# Patient Record
Sex: FEMALE | Race: WHITE | NOT HISPANIC OR LATINO
[De-identification: names, ages, dates, MRNs, and addresses within clinical notes are randomized per-mention and may not be internally consistent; named-entity substitution may affect disease eponyms.]

---

## 2017-01-11 ENCOUNTER — APPOINTMENT (OUTPATIENT)
Dept: PEDIATRIC ALLERGY IMMUNOLOGY | Facility: CLINIC | Age: 32
End: 2017-01-11

## 2017-02-01 ENCOUNTER — APPOINTMENT (OUTPATIENT)
Dept: PEDIATRIC ALLERGY IMMUNOLOGY | Facility: CLINIC | Age: 32
End: 2017-02-01

## 2017-03-01 ENCOUNTER — APPOINTMENT (OUTPATIENT)
Dept: PEDIATRIC ALLERGY IMMUNOLOGY | Facility: CLINIC | Age: 32
End: 2017-03-01

## 2017-03-01 DIAGNOSIS — T78.40XA ALLERGY, UNSPECIFIED, INITIAL ENCOUNTER: ICD-10-CM

## 2017-03-06 ENCOUNTER — APPOINTMENT (OUTPATIENT)
Dept: PEDIATRIC ALLERGY IMMUNOLOGY | Facility: CLINIC | Age: 32
End: 2017-03-06

## 2017-03-13 ENCOUNTER — APPOINTMENT (OUTPATIENT)
Dept: PEDIATRIC ALLERGY IMMUNOLOGY | Facility: CLINIC | Age: 32
End: 2017-03-13

## 2017-03-22 ENCOUNTER — APPOINTMENT (OUTPATIENT)
Dept: PEDIATRIC ALLERGY IMMUNOLOGY | Facility: CLINIC | Age: 32
End: 2017-03-22

## 2017-03-27 ENCOUNTER — APPOINTMENT (OUTPATIENT)
Dept: PEDIATRIC ALLERGY IMMUNOLOGY | Facility: CLINIC | Age: 32
End: 2017-03-27

## 2017-04-03 ENCOUNTER — APPOINTMENT (OUTPATIENT)
Dept: PEDIATRIC ALLERGY IMMUNOLOGY | Facility: CLINIC | Age: 32
End: 2017-04-03

## 2017-04-10 ENCOUNTER — APPOINTMENT (OUTPATIENT)
Dept: PEDIATRIC ALLERGY IMMUNOLOGY | Facility: CLINIC | Age: 32
End: 2017-04-10

## 2017-05-02 ENCOUNTER — MEDICATION RENEWAL (OUTPATIENT)
Age: 32
End: 2017-05-02

## 2017-05-04 ENCOUNTER — MEDICATION RENEWAL (OUTPATIENT)
Age: 32
End: 2017-05-04

## 2017-05-26 ENCOUNTER — APPOINTMENT (OUTPATIENT)
Dept: PEDIATRIC ALLERGY IMMUNOLOGY | Facility: CLINIC | Age: 32
End: 2017-05-26

## 2017-06-05 ENCOUNTER — APPOINTMENT (OUTPATIENT)
Dept: PEDIATRIC ALLERGY IMMUNOLOGY | Facility: CLINIC | Age: 32
End: 2017-06-05

## 2017-06-12 ENCOUNTER — APPOINTMENT (OUTPATIENT)
Dept: PEDIATRIC ALLERGY IMMUNOLOGY | Facility: CLINIC | Age: 32
End: 2017-06-12

## 2017-06-14 ENCOUNTER — APPOINTMENT (OUTPATIENT)
Dept: PEDIATRIC ALLERGY IMMUNOLOGY | Facility: CLINIC | Age: 32
End: 2017-06-14

## 2017-06-19 ENCOUNTER — APPOINTMENT (OUTPATIENT)
Dept: PEDIATRIC ALLERGY IMMUNOLOGY | Facility: CLINIC | Age: 32
End: 2017-06-19

## 2017-06-21 ENCOUNTER — APPOINTMENT (OUTPATIENT)
Dept: PEDIATRIC ALLERGY IMMUNOLOGY | Facility: CLINIC | Age: 32
End: 2017-06-21

## 2017-07-03 ENCOUNTER — APPOINTMENT (OUTPATIENT)
Dept: PEDIATRIC ALLERGY IMMUNOLOGY | Facility: CLINIC | Age: 32
End: 2017-07-03

## 2017-07-05 ENCOUNTER — APPOINTMENT (OUTPATIENT)
Dept: OBGYN | Facility: CLINIC | Age: 32
End: 2017-07-05

## 2017-07-24 ENCOUNTER — APPOINTMENT (OUTPATIENT)
Dept: PEDIATRIC ALLERGY IMMUNOLOGY | Facility: CLINIC | Age: 32
End: 2017-07-24

## 2017-07-25 ENCOUNTER — RX RENEWAL (OUTPATIENT)
Age: 32
End: 2017-07-25

## 2017-07-26 ENCOUNTER — APPOINTMENT (OUTPATIENT)
Dept: OBGYN | Facility: CLINIC | Age: 32
End: 2017-07-26

## 2017-08-09 ENCOUNTER — RX RENEWAL (OUTPATIENT)
Age: 32
End: 2017-08-09

## 2017-08-21 ENCOUNTER — APPOINTMENT (OUTPATIENT)
Dept: PEDIATRIC ALLERGY IMMUNOLOGY | Facility: CLINIC | Age: 32
End: 2017-08-21
Payer: COMMERCIAL

## 2017-08-21 PROCEDURE — 95165 ANTIGEN THERAPY SERVICES: CPT

## 2017-08-21 PROCEDURE — 95117 IMMUNOTHERAPY INJECTIONS: CPT

## 2017-08-22 ENCOUNTER — APPOINTMENT (OUTPATIENT)
Dept: BREAST CENTER | Facility: CLINIC | Age: 32
End: 2017-08-22
Payer: COMMERCIAL

## 2017-08-22 ENCOUNTER — APPOINTMENT (OUTPATIENT)
Dept: PLASTIC SURGERY | Facility: CLINIC | Age: 32
End: 2017-08-22
Payer: COMMERCIAL

## 2017-08-22 VITALS
HEIGHT: 66 IN | BODY MASS INDEX: 27.32 KG/M2 | DIASTOLIC BLOOD PRESSURE: 68 MMHG | HEART RATE: 89 BPM | WEIGHT: 170 LBS | RESPIRATION RATE: 16 BRPM | TEMPERATURE: 99.1 F | SYSTOLIC BLOOD PRESSURE: 110 MMHG

## 2017-08-22 DIAGNOSIS — Z80.3 FAMILY HISTORY OF MALIGNANT NEOPLASM OF BREAST: ICD-10-CM

## 2017-08-22 PROCEDURE — 99245 OFF/OP CONSLTJ NEW/EST HI 55: CPT

## 2017-09-18 ENCOUNTER — APPOINTMENT (OUTPATIENT)
Dept: PEDIATRIC ALLERGY IMMUNOLOGY | Facility: CLINIC | Age: 32
End: 2017-09-18
Payer: COMMERCIAL

## 2017-09-18 PROCEDURE — 95165 ANTIGEN THERAPY SERVICES: CPT

## 2017-09-18 PROCEDURE — 95117 IMMUNOTHERAPY INJECTIONS: CPT

## 2017-10-16 ENCOUNTER — APPOINTMENT (OUTPATIENT)
Dept: PEDIATRIC ALLERGY IMMUNOLOGY | Facility: CLINIC | Age: 32
End: 2017-10-16
Payer: COMMERCIAL

## 2017-10-16 PROCEDURE — 95165 ANTIGEN THERAPY SERVICES: CPT

## 2017-10-16 PROCEDURE — 95117 IMMUNOTHERAPY INJECTIONS: CPT

## 2017-11-13 ENCOUNTER — APPOINTMENT (OUTPATIENT)
Dept: PEDIATRIC ALLERGY IMMUNOLOGY | Facility: CLINIC | Age: 32
End: 2017-11-13
Payer: COMMERCIAL

## 2017-11-13 PROCEDURE — 95165 ANTIGEN THERAPY SERVICES: CPT

## 2017-11-13 PROCEDURE — 95117 IMMUNOTHERAPY INJECTIONS: CPT

## 2017-12-11 ENCOUNTER — APPOINTMENT (OUTPATIENT)
Dept: PEDIATRIC ALLERGY IMMUNOLOGY | Facility: CLINIC | Age: 32
End: 2017-12-11
Payer: COMMERCIAL

## 2017-12-11 PROCEDURE — 95165 ANTIGEN THERAPY SERVICES: CPT

## 2017-12-11 PROCEDURE — 95117 IMMUNOTHERAPY INJECTIONS: CPT

## 2018-01-10 ENCOUNTER — APPOINTMENT (OUTPATIENT)
Dept: PEDIATRIC ALLERGY IMMUNOLOGY | Facility: CLINIC | Age: 33
End: 2018-01-10
Payer: COMMERCIAL

## 2018-01-10 PROCEDURE — 95117 IMMUNOTHERAPY INJECTIONS: CPT

## 2018-01-10 PROCEDURE — 95165 ANTIGEN THERAPY SERVICES: CPT

## 2018-02-12 ENCOUNTER — APPOINTMENT (OUTPATIENT)
Dept: PEDIATRIC ALLERGY IMMUNOLOGY | Facility: CLINIC | Age: 33
End: 2018-02-12
Payer: COMMERCIAL

## 2018-02-12 PROCEDURE — 95165 ANTIGEN THERAPY SERVICES: CPT

## 2018-02-12 PROCEDURE — 95117 IMMUNOTHERAPY INJECTIONS: CPT

## 2018-03-12 ENCOUNTER — APPOINTMENT (OUTPATIENT)
Dept: PEDIATRIC ALLERGY IMMUNOLOGY | Facility: CLINIC | Age: 33
End: 2018-03-12
Payer: COMMERCIAL

## 2018-03-12 PROCEDURE — 95165 ANTIGEN THERAPY SERVICES: CPT

## 2018-03-12 PROCEDURE — 95117 IMMUNOTHERAPY INJECTIONS: CPT

## 2018-04-09 ENCOUNTER — APPOINTMENT (OUTPATIENT)
Dept: PEDIATRIC ALLERGY IMMUNOLOGY | Facility: CLINIC | Age: 33
End: 2018-04-09
Payer: COMMERCIAL

## 2018-04-09 PROCEDURE — 95117 IMMUNOTHERAPY INJECTIONS: CPT

## 2018-04-09 PROCEDURE — 95165 ANTIGEN THERAPY SERVICES: CPT

## 2018-05-07 ENCOUNTER — APPOINTMENT (OUTPATIENT)
Dept: PEDIATRIC ALLERGY IMMUNOLOGY | Facility: CLINIC | Age: 33
End: 2018-05-07
Payer: COMMERCIAL

## 2018-05-07 PROCEDURE — 95165 ANTIGEN THERAPY SERVICES: CPT

## 2018-05-07 PROCEDURE — 95117 IMMUNOTHERAPY INJECTIONS: CPT

## 2018-05-10 ENCOUNTER — APPOINTMENT (OUTPATIENT)
Dept: PLASTIC SURGERY | Facility: CLINIC | Age: 33
End: 2018-05-10
Payer: COMMERCIAL

## 2018-05-10 DIAGNOSIS — Z40.01 ENCOUNTER FOR PROPHYLACTIC REMOVAL OF BREAST: ICD-10-CM

## 2018-05-10 PROCEDURE — 99214 OFFICE O/P EST MOD 30 MIN: CPT

## 2018-05-15 PROBLEM — Z40.01 ENCOUNTER FOR PROPHYLACTIC REMOVAL OF BOTH BREASTS: Status: ACTIVE | Noted: 2017-08-22

## 2018-06-11 ENCOUNTER — APPOINTMENT (OUTPATIENT)
Dept: PEDIATRIC ALLERGY IMMUNOLOGY | Facility: CLINIC | Age: 33
End: 2018-06-11
Payer: COMMERCIAL

## 2018-06-11 PROCEDURE — 95165 ANTIGEN THERAPY SERVICES: CPT

## 2018-06-11 PROCEDURE — 95117 IMMUNOTHERAPY INJECTIONS: CPT

## 2018-06-21 ENCOUNTER — OUTPATIENT (OUTPATIENT)
Dept: OUTPATIENT SERVICES | Facility: HOSPITAL | Age: 33
LOS: 1 days | Discharge: HOME | End: 2018-06-21

## 2018-06-21 DIAGNOSIS — Z98.89 OTHER SPECIFIED POSTPROCEDURAL STATES: Chronic | ICD-10-CM

## 2018-07-09 ENCOUNTER — APPOINTMENT (OUTPATIENT)
Dept: PEDIATRIC ALLERGY IMMUNOLOGY | Facility: CLINIC | Age: 33
End: 2018-07-09
Payer: COMMERCIAL

## 2018-07-09 ENCOUNTER — OTHER (OUTPATIENT)
Age: 33
End: 2018-07-09

## 2018-07-09 PROCEDURE — 95165 ANTIGEN THERAPY SERVICES: CPT

## 2018-07-09 PROCEDURE — 95117 IMMUNOTHERAPY INJECTIONS: CPT

## 2018-08-01 ENCOUNTER — RESULT REVIEW (OUTPATIENT)
Age: 33
End: 2018-08-01

## 2018-08-01 ENCOUNTER — APPOINTMENT (OUTPATIENT)
Dept: OBGYN | Facility: CLINIC | Age: 33
End: 2018-08-01
Payer: COMMERCIAL

## 2018-08-01 ENCOUNTER — APPOINTMENT (OUTPATIENT)
Dept: PEDIATRIC ALLERGY IMMUNOLOGY | Facility: CLINIC | Age: 33
End: 2018-08-01
Payer: COMMERCIAL

## 2018-08-01 PROCEDURE — 99395 PREV VISIT EST AGE 18-39: CPT

## 2018-08-01 PROCEDURE — 95117 IMMUNOTHERAPY INJECTIONS: CPT

## 2018-08-01 PROCEDURE — 36415 COLL VENOUS BLD VENIPUNCTURE: CPT

## 2018-08-01 PROCEDURE — 95165 ANTIGEN THERAPY SERVICES: CPT

## 2018-08-31 ENCOUNTER — APPOINTMENT (OUTPATIENT)
Dept: PEDIATRIC ALLERGY IMMUNOLOGY | Facility: CLINIC | Age: 33
End: 2018-08-31
Payer: COMMERCIAL

## 2018-08-31 ENCOUNTER — APPOINTMENT (OUTPATIENT)
Dept: GYNECOLOGIC ONCOLOGY | Facility: CLINIC | Age: 33
End: 2018-08-31

## 2018-08-31 PROCEDURE — 95165 ANTIGEN THERAPY SERVICES: CPT

## 2018-08-31 PROCEDURE — 95117 IMMUNOTHERAPY INJECTIONS: CPT

## 2018-09-26 ENCOUNTER — APPOINTMENT (OUTPATIENT)
Dept: PEDIATRIC ALLERGY IMMUNOLOGY | Facility: CLINIC | Age: 33
End: 2018-09-26
Payer: COMMERCIAL

## 2018-09-26 PROCEDURE — 95117 IMMUNOTHERAPY INJECTIONS: CPT

## 2018-09-26 PROCEDURE — 95165 ANTIGEN THERAPY SERVICES: CPT

## 2018-10-24 ENCOUNTER — APPOINTMENT (OUTPATIENT)
Dept: PEDIATRIC ALLERGY IMMUNOLOGY | Facility: CLINIC | Age: 33
End: 2018-10-24
Payer: COMMERCIAL

## 2018-10-24 VITALS
HEART RATE: 85 BPM | WEIGHT: 175.8 LBS | DIASTOLIC BLOOD PRESSURE: 76 MMHG | SYSTOLIC BLOOD PRESSURE: 111 MMHG | BODY MASS INDEX: 28.38 KG/M2

## 2018-10-24 DIAGNOSIS — L85.3 XEROSIS CUTIS: ICD-10-CM

## 2018-10-24 PROCEDURE — 95117 IMMUNOTHERAPY INJECTIONS: CPT | Mod: GC

## 2018-10-24 PROCEDURE — 99214 OFFICE O/P EST MOD 30 MIN: CPT | Mod: 25,GC

## 2018-10-24 PROCEDURE — 95165 ANTIGEN THERAPY SERVICES: CPT | Mod: GC

## 2018-11-19 ENCOUNTER — APPOINTMENT (OUTPATIENT)
Dept: PEDIATRIC ALLERGY IMMUNOLOGY | Facility: CLINIC | Age: 33
End: 2018-11-19
Payer: COMMERCIAL

## 2018-11-19 PROCEDURE — 95117 IMMUNOTHERAPY INJECTIONS: CPT

## 2018-11-19 PROCEDURE — 95165 ANTIGEN THERAPY SERVICES: CPT

## 2018-12-17 ENCOUNTER — APPOINTMENT (OUTPATIENT)
Dept: PEDIATRIC ALLERGY IMMUNOLOGY | Facility: CLINIC | Age: 33
End: 2018-12-17
Payer: COMMERCIAL

## 2018-12-17 PROCEDURE — 95165 ANTIGEN THERAPY SERVICES: CPT

## 2018-12-17 PROCEDURE — 95117 IMMUNOTHERAPY INJECTIONS: CPT

## 2019-01-10 ENCOUNTER — APPOINTMENT (OUTPATIENT)
Dept: OBGYN | Facility: CLINIC | Age: 34
End: 2019-01-10
Payer: COMMERCIAL

## 2019-01-10 PROCEDURE — 99213 OFFICE O/P EST LOW 20 MIN: CPT

## 2019-01-10 PROCEDURE — 36415 COLL VENOUS BLD VENIPUNCTURE: CPT

## 2019-01-14 ENCOUNTER — APPOINTMENT (OUTPATIENT)
Dept: PEDIATRIC ALLERGY IMMUNOLOGY | Facility: CLINIC | Age: 34
End: 2019-01-14
Payer: COMMERCIAL

## 2019-01-14 PROCEDURE — 95117 IMMUNOTHERAPY INJECTIONS: CPT

## 2019-01-14 PROCEDURE — 95165 ANTIGEN THERAPY SERVICES: CPT

## 2019-02-15 ENCOUNTER — APPOINTMENT (OUTPATIENT)
Dept: PEDIATRIC ALLERGY IMMUNOLOGY | Facility: CLINIC | Age: 34
End: 2019-02-15
Payer: COMMERCIAL

## 2019-02-15 PROCEDURE — 95165 ANTIGEN THERAPY SERVICES: CPT

## 2019-02-15 PROCEDURE — 95117 IMMUNOTHERAPY INJECTIONS: CPT

## 2019-02-28 ENCOUNTER — TRANSCRIPTION ENCOUNTER (OUTPATIENT)
Age: 34
End: 2019-02-28

## 2019-03-13 ENCOUNTER — APPOINTMENT (OUTPATIENT)
Dept: PEDIATRIC ALLERGY IMMUNOLOGY | Facility: CLINIC | Age: 34
End: 2019-03-13
Payer: COMMERCIAL

## 2019-03-13 ENCOUNTER — APPOINTMENT (OUTPATIENT)
Dept: OBGYN | Facility: CLINIC | Age: 34
End: 2019-03-13

## 2019-03-13 PROCEDURE — 95117 IMMUNOTHERAPY INJECTIONS: CPT | Mod: GC

## 2019-03-13 PROCEDURE — 95165 ANTIGEN THERAPY SERVICES: CPT | Mod: GC

## 2019-04-08 ENCOUNTER — APPOINTMENT (OUTPATIENT)
Dept: PEDIATRIC ALLERGY IMMUNOLOGY | Facility: CLINIC | Age: 34
End: 2019-04-08
Payer: COMMERCIAL

## 2019-04-08 PROCEDURE — 95165 ANTIGEN THERAPY SERVICES: CPT

## 2019-04-08 PROCEDURE — 95117 IMMUNOTHERAPY INJECTIONS: CPT

## 2019-04-11 ENCOUNTER — APPOINTMENT (OUTPATIENT)
Dept: OBGYN | Facility: CLINIC | Age: 34
End: 2019-04-11

## 2019-04-11 ENCOUNTER — APPOINTMENT (OUTPATIENT)
Dept: OBGYN | Facility: CLINIC | Age: 34
End: 2019-04-11
Payer: COMMERCIAL

## 2019-04-11 PROCEDURE — 58301 REMOVE INTRAUTERINE DEVICE: CPT

## 2019-05-07 ENCOUNTER — APPOINTMENT (OUTPATIENT)
Dept: PEDIATRIC ALLERGY IMMUNOLOGY | Facility: CLINIC | Age: 34
End: 2019-05-07
Payer: COMMERCIAL

## 2019-05-07 PROCEDURE — 95117 IMMUNOTHERAPY INJECTIONS: CPT

## 2019-05-07 PROCEDURE — 95165 ANTIGEN THERAPY SERVICES: CPT

## 2019-06-04 ENCOUNTER — APPOINTMENT (OUTPATIENT)
Dept: PEDIATRIC ALLERGY IMMUNOLOGY | Facility: CLINIC | Age: 34
End: 2019-06-04
Payer: COMMERCIAL

## 2019-06-04 PROCEDURE — 95165 ANTIGEN THERAPY SERVICES: CPT

## 2019-06-04 PROCEDURE — 95117 IMMUNOTHERAPY INJECTIONS: CPT

## 2019-06-10 ENCOUNTER — OUTPATIENT (OUTPATIENT)
Dept: OUTPATIENT SERVICES | Facility: HOSPITAL | Age: 34
LOS: 1 days | Discharge: HOME | End: 2019-06-10

## 2019-06-10 DIAGNOSIS — Z98.89 OTHER SPECIFIED POSTPROCEDURAL STATES: Chronic | ICD-10-CM

## 2019-06-10 DIAGNOSIS — D64.9 ANEMIA, UNSPECIFIED: ICD-10-CM

## 2019-06-14 ENCOUNTER — APPOINTMENT (OUTPATIENT)
Dept: GYNECOLOGIC ONCOLOGY | Facility: CLINIC | Age: 34
End: 2019-06-14
Payer: COMMERCIAL

## 2019-06-14 ENCOUNTER — RESULT REVIEW (OUTPATIENT)
Age: 34
End: 2019-06-14

## 2019-06-14 ENCOUNTER — APPOINTMENT (OUTPATIENT)
Dept: HEMATOLOGY ONCOLOGY | Facility: CLINIC | Age: 34
End: 2019-06-14
Payer: COMMERCIAL

## 2019-06-14 VITALS
HEIGHT: 66 IN | SYSTOLIC BLOOD PRESSURE: 120 MMHG | BODY MASS INDEX: 27.64 KG/M2 | HEART RATE: 72 BPM | DIASTOLIC BLOOD PRESSURE: 79 MMHG | WEIGHT: 172 LBS

## 2019-06-14 VITALS
HEIGHT: 66.93 IN | DIASTOLIC BLOOD PRESSURE: 75 MMHG | SYSTOLIC BLOOD PRESSURE: 117 MMHG | HEART RATE: 77 BPM | OXYGEN SATURATION: 100 % | TEMPERATURE: 98.2 F | WEIGHT: 173.06 LBS | RESPIRATION RATE: 16 BRPM | BODY MASS INDEX: 27.16 KG/M2

## 2019-06-14 DIAGNOSIS — D72.819 DECREASED WHITE BLOOD CELL COUNT, UNSPECIFIED: ICD-10-CM

## 2019-06-14 DIAGNOSIS — D64.9 ANEMIA, UNSPECIFIED: ICD-10-CM

## 2019-06-14 DIAGNOSIS — Z80.0 FAMILY HISTORY OF MALIGNANT NEOPLASM OF DIGESTIVE ORGANS: ICD-10-CM

## 2019-06-14 DIAGNOSIS — Z80.8 FAMILY HISTORY OF MALIGNANT NEOPLASM OF OTHER ORGANS OR SYSTEMS: ICD-10-CM

## 2019-06-14 LAB
BASOPHILS # BLD AUTO: 0 K/UL — SIGNIFICANT CHANGE UP (ref 0–0.2)
BASOPHILS NFR BLD AUTO: 0 % — SIGNIFICANT CHANGE UP (ref 0–2)
EOSINOPHIL # BLD AUTO: 0 K/UL — SIGNIFICANT CHANGE UP (ref 0–0.5)
EOSINOPHIL NFR BLD AUTO: 0.8 % — SIGNIFICANT CHANGE UP (ref 0–6)
HCT VFR BLD CALC: 35.6 % — SIGNIFICANT CHANGE UP (ref 34.5–45)
HGB BLD-MCNC: 12.3 G/DL — SIGNIFICANT CHANGE UP (ref 11.5–15.5)
LYMPHOCYTES # BLD AUTO: 1.3 K/UL — SIGNIFICANT CHANGE UP (ref 1–3.3)
LYMPHOCYTES # BLD AUTO: 29.3 % — SIGNIFICANT CHANGE UP (ref 13–44)
MCHC RBC-ENTMCNC: 34.7 G/DL — SIGNIFICANT CHANGE UP (ref 32–36)
MCHC RBC-ENTMCNC: 34.7 PG — HIGH (ref 27–34)
MCV RBC AUTO: 100 FL — SIGNIFICANT CHANGE UP (ref 80–100)
MONOCYTES # BLD AUTO: 0.2 K/UL — SIGNIFICANT CHANGE UP (ref 0–0.9)
MONOCYTES NFR BLD AUTO: 5.4 % — SIGNIFICANT CHANGE UP (ref 2–14)
NEUTROPHILS # BLD AUTO: 2.8 K/UL — SIGNIFICANT CHANGE UP (ref 1.8–7.4)
NEUTROPHILS NFR BLD AUTO: 64.4 % — SIGNIFICANT CHANGE UP (ref 43–77)
PLATELET # BLD AUTO: 250 K/UL — SIGNIFICANT CHANGE UP (ref 150–400)
RBC # BLD: 3.55 M/UL — LOW (ref 3.8–5.2)
RBC # FLD: 11 % — SIGNIFICANT CHANGE UP (ref 10.3–14.5)
WBC # BLD: 4.4 K/UL — SIGNIFICANT CHANGE UP (ref 3.8–10.5)
WBC # FLD AUTO: 4.4 K/UL — SIGNIFICANT CHANGE UP (ref 3.8–10.5)

## 2019-06-14 PROCEDURE — 99244 OFF/OP CNSLTJ NEW/EST MOD 40: CPT

## 2019-06-14 PROCEDURE — 99243 OFF/OP CNSLTJ NEW/EST LOW 30: CPT

## 2019-06-14 RX ORDER — NORETHINDRONE ACETATE/ETHINYL ESTRADIOL 1MG-20MCG
1-20 KIT ORAL
Refills: 0 | Status: DISCONTINUED | COMMUNITY
End: 2019-06-14

## 2019-06-14 RX ORDER — DESOGESTREL AND ETHINYL ESTRADIOL 0.15-0.03
KIT ORAL
Refills: 0 | Status: ACTIVE | COMMUNITY

## 2019-06-15 LAB — CANCER AG125 SERPL-ACNC: 9 U/ML

## 2019-06-18 PROBLEM — D72.819 LEUKOPENIA, UNSPECIFIED TYPE: Status: ACTIVE | Noted: 2019-06-18

## 2019-06-18 PROBLEM — D64.9 ANEMIA, UNSPECIFIED TYPE: Status: ACTIVE | Noted: 2019-06-18

## 2019-06-19 NOTE — ASSESSMENT
[FreeTextEntry1] : 35yo F here for evaluation of anemia. \par Counts are within normal today. She has slightly macrocytosis but her B12/folate/TSH/LFTs wnl. Cont to monitor\par RTC 1 yr

## 2019-06-19 NOTE — HISTORY OF PRESENT ILLNESS
[de-identified] : 35yo F here for evaluation of anemia. \par \par Pt reports that she had a physical in April (Dr. Tommie Baca is PMD) - CBC done was concerning so she had it repeated in May and the counts were lower. She reports that there is concern either WBC or RBC always low. She was sent to a hematologist in 2011 in [place] for this issue -w/u unremarkable per pt. On review of labs for the last year, her WBC was occasionally 3.7 with normal diff. Hgb ranged from 11.5-12.6. Pt has no complaints. Vit B12 547, folate 13. TSH 1.41, ferritin 68. HIV nonreactive. ESR wnl.\par \par She is in the process of egg freezing, all meds on hold (spironolactone and OCP)

## 2019-06-28 ENCOUNTER — OTHER (OUTPATIENT)
Age: 34
End: 2019-06-28

## 2019-07-01 ENCOUNTER — OTHER (OUTPATIENT)
Age: 34
End: 2019-07-01

## 2019-07-05 ENCOUNTER — APPOINTMENT (OUTPATIENT)
Dept: PEDIATRIC ALLERGY IMMUNOLOGY | Facility: CLINIC | Age: 34
End: 2019-07-05
Payer: COMMERCIAL

## 2019-07-05 PROCEDURE — 95117 IMMUNOTHERAPY INJECTIONS: CPT

## 2019-07-05 PROCEDURE — 95165 ANTIGEN THERAPY SERVICES: CPT

## 2019-07-08 ENCOUNTER — TRANSCRIPTION ENCOUNTER (OUTPATIENT)
Age: 34
End: 2019-07-08

## 2019-07-18 ENCOUNTER — OTHER (OUTPATIENT)
Age: 34
End: 2019-07-18

## 2019-08-02 ENCOUNTER — APPOINTMENT (OUTPATIENT)
Dept: PEDIATRIC ALLERGY IMMUNOLOGY | Facility: CLINIC | Age: 34
End: 2019-08-02
Payer: COMMERCIAL

## 2019-08-02 PROCEDURE — 95165 ANTIGEN THERAPY SERVICES: CPT

## 2019-08-02 PROCEDURE — 95117 IMMUNOTHERAPY INJECTIONS: CPT

## 2019-08-19 ENCOUNTER — OTHER (OUTPATIENT)
Age: 34
End: 2019-08-19

## 2019-08-28 ENCOUNTER — RESULT REVIEW (OUTPATIENT)
Age: 34
End: 2019-08-28

## 2019-08-29 ENCOUNTER — APPOINTMENT (OUTPATIENT)
Dept: OBGYN | Facility: CLINIC | Age: 34
End: 2019-08-29
Payer: COMMERCIAL

## 2019-08-29 PROCEDURE — 99395 PREV VISIT EST AGE 18-39: CPT

## 2019-08-30 ENCOUNTER — APPOINTMENT (OUTPATIENT)
Dept: PEDIATRIC ALLERGY IMMUNOLOGY | Facility: CLINIC | Age: 34
End: 2019-08-30
Payer: COMMERCIAL

## 2019-08-30 PROCEDURE — 95165 ANTIGEN THERAPY SERVICES: CPT

## 2019-08-30 PROCEDURE — 95117 IMMUNOTHERAPY INJECTIONS: CPT

## 2019-09-30 ENCOUNTER — APPOINTMENT (OUTPATIENT)
Dept: PEDIATRIC ALLERGY IMMUNOLOGY | Facility: CLINIC | Age: 34
End: 2019-09-30
Payer: COMMERCIAL

## 2019-09-30 PROCEDURE — 95165 ANTIGEN THERAPY SERVICES: CPT

## 2019-09-30 PROCEDURE — 95117 IMMUNOTHERAPY INJECTIONS: CPT

## 2019-11-01 ENCOUNTER — APPOINTMENT (OUTPATIENT)
Dept: PEDIATRIC ALLERGY IMMUNOLOGY | Facility: CLINIC | Age: 34
End: 2019-11-01
Payer: COMMERCIAL

## 2019-11-01 PROCEDURE — 95117 IMMUNOTHERAPY INJECTIONS: CPT

## 2019-11-01 PROCEDURE — 95165 ANTIGEN THERAPY SERVICES: CPT

## 2019-11-26 ENCOUNTER — APPOINTMENT (OUTPATIENT)
Dept: PEDIATRIC ALLERGY IMMUNOLOGY | Facility: CLINIC | Age: 34
End: 2019-11-26
Payer: COMMERCIAL

## 2019-11-26 PROCEDURE — 95165 ANTIGEN THERAPY SERVICES: CPT

## 2019-11-26 PROCEDURE — 95117 IMMUNOTHERAPY INJECTIONS: CPT

## 2019-12-27 ENCOUNTER — APPOINTMENT (OUTPATIENT)
Dept: PEDIATRIC ALLERGY IMMUNOLOGY | Facility: CLINIC | Age: 34
End: 2019-12-27
Payer: COMMERCIAL

## 2019-12-27 PROCEDURE — 95117 IMMUNOTHERAPY INJECTIONS: CPT

## 2019-12-27 PROCEDURE — 95165 ANTIGEN THERAPY SERVICES: CPT

## 2020-02-05 ENCOUNTER — APPOINTMENT (OUTPATIENT)
Dept: PEDIATRIC ALLERGY IMMUNOLOGY | Facility: CLINIC | Age: 35
End: 2020-02-05
Payer: COMMERCIAL

## 2020-02-05 PROCEDURE — 95117 IMMUNOTHERAPY INJECTIONS: CPT

## 2020-02-05 PROCEDURE — 95165 ANTIGEN THERAPY SERVICES: CPT

## 2020-02-07 ENCOUNTER — APPOINTMENT (OUTPATIENT)
Dept: GYNECOLOGIC ONCOLOGY | Facility: CLINIC | Age: 35
End: 2020-02-07

## 2020-02-14 ENCOUNTER — APPOINTMENT (OUTPATIENT)
Dept: OBGYN | Facility: CLINIC | Age: 35
End: 2020-02-14

## 2020-02-14 ENCOUNTER — APPOINTMENT (OUTPATIENT)
Dept: GYNECOLOGIC ONCOLOGY | Facility: CLINIC | Age: 35
End: 2020-02-14

## 2020-03-02 ENCOUNTER — APPOINTMENT (OUTPATIENT)
Dept: PEDIATRIC ALLERGY IMMUNOLOGY | Facility: CLINIC | Age: 35
End: 2020-03-02
Payer: COMMERCIAL

## 2020-03-02 PROCEDURE — 95117 IMMUNOTHERAPY INJECTIONS: CPT

## 2020-03-02 PROCEDURE — 95165 ANTIGEN THERAPY SERVICES: CPT

## 2020-03-06 ENCOUNTER — APPOINTMENT (OUTPATIENT)
Dept: GYNECOLOGIC ONCOLOGY | Facility: CLINIC | Age: 35
End: 2020-03-06

## 2020-03-09 LAB — CANCER AG125 SERPL-ACNC: 8 U/ML

## 2020-04-10 ENCOUNTER — APPOINTMENT (OUTPATIENT)
Dept: PEDIATRIC ALLERGY IMMUNOLOGY | Facility: CLINIC | Age: 35
End: 2020-04-10
Payer: COMMERCIAL

## 2020-04-10 PROCEDURE — 95117 IMMUNOTHERAPY INJECTIONS: CPT

## 2020-04-10 PROCEDURE — 95165 ANTIGEN THERAPY SERVICES: CPT

## 2020-05-01 ENCOUNTER — APPOINTMENT (OUTPATIENT)
Dept: GYNECOLOGIC ONCOLOGY | Facility: CLINIC | Age: 35
End: 2020-05-01
Payer: COMMERCIAL

## 2020-05-01 VITALS
SYSTOLIC BLOOD PRESSURE: 114 MMHG | HEART RATE: 96 BPM | TEMPERATURE: 98.7 F | DIASTOLIC BLOOD PRESSURE: 74 MMHG | WEIGHT: 165 LBS | BODY MASS INDEX: 26.52 KG/M2 | HEIGHT: 66 IN

## 2020-05-01 PROCEDURE — 99213 OFFICE O/P EST LOW 20 MIN: CPT

## 2020-05-11 ENCOUNTER — APPOINTMENT (OUTPATIENT)
Dept: PEDIATRIC ALLERGY IMMUNOLOGY | Facility: CLINIC | Age: 35
End: 2020-05-11
Payer: COMMERCIAL

## 2020-05-11 PROCEDURE — 95165 ANTIGEN THERAPY SERVICES: CPT

## 2020-05-11 PROCEDURE — 95117 IMMUNOTHERAPY INJECTIONS: CPT

## 2020-06-15 ENCOUNTER — APPOINTMENT (OUTPATIENT)
Dept: PEDIATRIC ALLERGY IMMUNOLOGY | Facility: CLINIC | Age: 35
End: 2020-06-15
Payer: COMMERCIAL

## 2020-06-15 PROCEDURE — 95165 ANTIGEN THERAPY SERVICES: CPT

## 2020-06-15 PROCEDURE — 95117 IMMUNOTHERAPY INJECTIONS: CPT

## 2020-07-16 ENCOUNTER — APPOINTMENT (OUTPATIENT)
Dept: PEDIATRIC ALLERGY IMMUNOLOGY | Facility: CLINIC | Age: 35
End: 2020-07-16
Payer: COMMERCIAL

## 2020-07-16 PROCEDURE — 95117 IMMUNOTHERAPY INJECTIONS: CPT

## 2020-07-16 PROCEDURE — 95165 ANTIGEN THERAPY SERVICES: CPT

## 2020-08-13 ENCOUNTER — APPOINTMENT (OUTPATIENT)
Dept: PEDIATRIC ALLERGY IMMUNOLOGY | Facility: CLINIC | Age: 35
End: 2020-08-13
Payer: COMMERCIAL

## 2020-08-13 PROCEDURE — 95117 IMMUNOTHERAPY INJECTIONS: CPT

## 2020-08-13 PROCEDURE — 95165 ANTIGEN THERAPY SERVICES: CPT

## 2020-09-14 ENCOUNTER — APPOINTMENT (OUTPATIENT)
Dept: PEDIATRIC ALLERGY IMMUNOLOGY | Facility: CLINIC | Age: 35
End: 2020-09-14
Payer: COMMERCIAL

## 2020-09-14 PROCEDURE — 95117 IMMUNOTHERAPY INJECTIONS: CPT

## 2020-09-14 PROCEDURE — 95165 ANTIGEN THERAPY SERVICES: CPT

## 2020-10-15 ENCOUNTER — APPOINTMENT (OUTPATIENT)
Dept: PEDIATRIC ALLERGY IMMUNOLOGY | Facility: CLINIC | Age: 35
End: 2020-10-15
Payer: COMMERCIAL

## 2020-10-15 PROCEDURE — 95117 IMMUNOTHERAPY INJECTIONS: CPT

## 2020-10-15 PROCEDURE — 95165 ANTIGEN THERAPY SERVICES: CPT

## 2020-10-28 ENCOUNTER — RESULT REVIEW (OUTPATIENT)
Age: 35
End: 2020-10-28

## 2020-10-28 ENCOUNTER — APPOINTMENT (OUTPATIENT)
Dept: OBGYN | Facility: CLINIC | Age: 35
End: 2020-10-28
Payer: COMMERCIAL

## 2020-10-28 VITALS
BODY MASS INDEX: 25.23 KG/M2 | DIASTOLIC BLOOD PRESSURE: 70 MMHG | SYSTOLIC BLOOD PRESSURE: 112 MMHG | WEIGHT: 157 LBS | HEIGHT: 66 IN

## 2020-10-28 PROCEDURE — 99395 PREV VISIT EST AGE 18-39: CPT

## 2020-10-28 PROCEDURE — 99072 ADDL SUPL MATRL&STAF TM PHE: CPT

## 2020-11-06 ENCOUNTER — APPOINTMENT (OUTPATIENT)
Dept: GYNECOLOGIC ONCOLOGY | Facility: CLINIC | Age: 35
End: 2020-11-06
Payer: COMMERCIAL

## 2020-11-06 VITALS
SYSTOLIC BLOOD PRESSURE: 116 MMHG | HEART RATE: 80 BPM | DIASTOLIC BLOOD PRESSURE: 76 MMHG | WEIGHT: 158 LBS | BODY MASS INDEX: 25.39 KG/M2 | HEIGHT: 66 IN

## 2020-11-06 PROCEDURE — 99213 OFFICE O/P EST LOW 20 MIN: CPT

## 2020-11-06 PROCEDURE — 99072 ADDL SUPL MATRL&STAF TM PHE: CPT

## 2020-11-09 LAB — CANCER AG125 SERPL-ACNC: 10 U/ML

## 2020-11-12 ENCOUNTER — APPOINTMENT (OUTPATIENT)
Dept: PEDIATRIC ALLERGY IMMUNOLOGY | Facility: CLINIC | Age: 35
End: 2020-11-12
Payer: COMMERCIAL

## 2020-11-12 PROCEDURE — 95165 ANTIGEN THERAPY SERVICES: CPT

## 2020-11-12 PROCEDURE — 95117 IMMUNOTHERAPY INJECTIONS: CPT

## 2020-12-08 ENCOUNTER — APPOINTMENT (OUTPATIENT)
Dept: PEDIATRIC ALLERGY IMMUNOLOGY | Facility: CLINIC | Age: 35
End: 2020-12-08
Payer: COMMERCIAL

## 2020-12-08 VITALS
OXYGEN SATURATION: 98 % | HEART RATE: 97 BPM | BODY MASS INDEX: 24.11 KG/M2 | TEMPERATURE: 96.7 F | DIASTOLIC BLOOD PRESSURE: 81 MMHG | SYSTOLIC BLOOD PRESSURE: 115 MMHG | HEIGHT: 66 IN | WEIGHT: 150 LBS

## 2020-12-08 DIAGNOSIS — J30.89 OTHER ALLERGIC RHINITIS: ICD-10-CM

## 2020-12-08 DIAGNOSIS — J30.2 OTHER ALLERGIC RHINITIS: ICD-10-CM

## 2020-12-08 DIAGNOSIS — H10.13 ACUTE ATOPIC CONJUNCTIVITIS, BILATERAL: ICD-10-CM

## 2020-12-08 PROCEDURE — 99214 OFFICE O/P EST MOD 30 MIN: CPT | Mod: 25

## 2020-12-08 PROCEDURE — 95117 IMMUNOTHERAPY INJECTIONS: CPT

## 2020-12-08 PROCEDURE — 99072 ADDL SUPL MATRL&STAF TM PHE: CPT

## 2020-12-08 PROCEDURE — 95165 ANTIGEN THERAPY SERVICES: CPT

## 2020-12-08 RX ORDER — NORGESTREL AND ETHINYL ESTRADIOL 0.3-0.03MG
0.3-3 KIT ORAL
Qty: 28 | Refills: 0 | Status: COMPLETED | COMMUNITY
Start: 2019-06-07 | End: 2020-12-08

## 2020-12-08 RX ORDER — CEFADROXIL 500 MG/1
500 CAPSULE ORAL
Qty: 14 | Refills: 0 | Status: COMPLETED | COMMUNITY
Start: 2020-08-10

## 2020-12-08 NOTE — HISTORY OF PRESENT ILLNESS
[Asthma] : asthma [Eczematous rashes] : eczematous rashes [de-identified] : JOANNA HENAO is a 35 year  old female with Allergic Rhinoconjunctivitis, on allergen immunotherapy,  returns for a follow up visit and IT.\par \par Allergic Rhinoconjunctivitis with sensitivity to dust mites, cat, dog, hanson, mold tree, grass and weed pollen.\par Patient has been on maintenance IT since 6/2017. She reports significant improvement in her nasal and ocular symptoms since starting IT. She hasn't been needing nasal sprays and uses Levocetirizine occasionally.   \par \par Allergic Conjunctivitis, symptoms predominantly in the spring:\par - Olopatadine PRN helps\par

## 2020-12-08 NOTE — PHYSICAL EXAM
[Alert] : alert [Well Nourished] : well nourished [Healthy Appearance] : healthy appearance [No Acute Distress] : no acute distress [No Discharge] : no discharge [No Photophobia] : no photophobia [Sclera Not Icteric] : sclera not icteric [Conjunctival Erythema] : no conjunctival erythema [Suborbital Bogginess] : no suborbital bogginess (allergic shiners) [Normal TMs] : both tympanic membranes were normal [Normal Nasal Mucosa] : the nasal mucosa was normal [Normal Lips/Tongue] : the lips and tongue were normal [Normal Outer Ear/Nose] : the ears and nose were normal in appearance [No Thrush] : no thrush [No Oral Lesions or Ulcers] : no oral lesions or ulcers [Boggy Nasal Turbinates] : no boggy and/or pale nasal turbinates [Pharyngeal erythema] : no pharyngeal erythema [Posterior Pharyngeal Cobblestoning] : no posterior pharyngeal cobblestoning [Clear Rhinorrhea] : no clear rhinorrhea was seen [Exudate] : no exudate [No Neck Mass] : no neck mass was observed [Supple] : the neck was supple [Normal Rate and Effort] : normal respiratory rhythm and effort [No Crackles] : no crackles [Bilateral Audible Breath Sounds] : bilateral audible breath sounds [Wheezing] : no wheezing was heard [Normal Rate] : heart rate was normal  [Normal S1, S2] : normal S1 and S2 [Regular Rhythm] : with a regular rhythm [Soft] : abdomen soft [Not Tender] : non-tender [Not Distended] : not distended [No HSM] : no hepato-splenomegaly [Normal Cervical Lymph Nodes] : cervical [Skin Intact] : skin intact  [No Rash] : no rash [Eczematous Patches] : no eczematous patches [Xerosis] : no xerosis [No clubbing] : no clubbing [No Edema] : no edema [No Cyanosis] : no cyanosis [Normal Mood] : mood was normal [Normal Affect] : affect was normal [Alert, Awake, Oriented as Age-Appropriate] : alert, awake, oriented as age appropriate

## 2021-01-07 ENCOUNTER — APPOINTMENT (OUTPATIENT)
Dept: PEDIATRIC ALLERGY IMMUNOLOGY | Facility: CLINIC | Age: 36
End: 2021-01-07
Payer: COMMERCIAL

## 2021-01-07 PROCEDURE — 99072 ADDL SUPL MATRL&STAF TM PHE: CPT

## 2021-01-07 PROCEDURE — 95117 IMMUNOTHERAPY INJECTIONS: CPT

## 2021-01-07 PROCEDURE — 95165 ANTIGEN THERAPY SERVICES: CPT

## 2021-02-11 ENCOUNTER — APPOINTMENT (OUTPATIENT)
Dept: PEDIATRIC ALLERGY IMMUNOLOGY | Facility: CLINIC | Age: 36
End: 2021-02-11
Payer: COMMERCIAL

## 2021-02-11 PROCEDURE — 95165 ANTIGEN THERAPY SERVICES: CPT

## 2021-02-11 PROCEDURE — 95117 IMMUNOTHERAPY INJECTIONS: CPT

## 2021-03-11 ENCOUNTER — APPOINTMENT (OUTPATIENT)
Dept: PEDIATRIC ALLERGY IMMUNOLOGY | Facility: CLINIC | Age: 36
End: 2021-03-11
Payer: COMMERCIAL

## 2021-03-11 PROCEDURE — 95165 ANTIGEN THERAPY SERVICES: CPT

## 2021-03-11 PROCEDURE — 95117 IMMUNOTHERAPY INJECTIONS: CPT

## 2021-04-07 ENCOUNTER — APPOINTMENT (OUTPATIENT)
Dept: PEDIATRIC ALLERGY IMMUNOLOGY | Facility: CLINIC | Age: 36
End: 2021-04-07
Payer: COMMERCIAL

## 2021-04-07 DIAGNOSIS — J30.81 ALLERGIC RHINITIS DUE TO ANIMAL (CAT) (DOG) HAIR AND DANDER: ICD-10-CM

## 2021-04-07 PROCEDURE — 95117 IMMUNOTHERAPY INJECTIONS: CPT

## 2021-04-07 PROCEDURE — 95165 ANTIGEN THERAPY SERVICES: CPT

## 2021-05-06 ENCOUNTER — APPOINTMENT (OUTPATIENT)
Dept: PEDIATRIC ALLERGY IMMUNOLOGY | Facility: CLINIC | Age: 36
End: 2021-05-06
Payer: COMMERCIAL

## 2021-05-06 PROCEDURE — 95117 IMMUNOTHERAPY INJECTIONS: CPT

## 2021-05-06 PROCEDURE — 95165 ANTIGEN THERAPY SERVICES: CPT

## 2021-05-18 ENCOUNTER — RESULT REVIEW (OUTPATIENT)
Age: 36
End: 2021-05-18

## 2021-05-18 ENCOUNTER — APPOINTMENT (OUTPATIENT)
Dept: ULTRASOUND IMAGING | Facility: CLINIC | Age: 36
End: 2021-05-18
Payer: COMMERCIAL

## 2021-05-18 PROCEDURE — 76830 TRANSVAGINAL US NON-OB: CPT

## 2021-05-21 ENCOUNTER — APPOINTMENT (OUTPATIENT)
Dept: ULTRASOUND IMAGING | Facility: CLINIC | Age: 36
End: 2021-05-21

## 2021-05-28 ENCOUNTER — APPOINTMENT (OUTPATIENT)
Dept: GYNECOLOGIC ONCOLOGY | Facility: CLINIC | Age: 36
End: 2021-05-28
Payer: COMMERCIAL

## 2021-05-28 VITALS
HEIGHT: 66 IN | SYSTOLIC BLOOD PRESSURE: 119 MMHG | HEART RATE: 82 BPM | DIASTOLIC BLOOD PRESSURE: 73 MMHG | OXYGEN SATURATION: 99 % | WEIGHT: 159.38 LBS | BODY MASS INDEX: 25.61 KG/M2

## 2021-05-28 PROCEDURE — 99214 OFFICE O/P EST MOD 30 MIN: CPT

## 2021-05-28 RX ORDER — SPIRONOLACTONE 50 MG/1
50 TABLET ORAL
Refills: 0 | Status: COMPLETED | COMMUNITY
End: 2021-05-28

## 2021-05-29 LAB — CANCER AG125 SERPL-ACNC: 10 U/ML

## 2021-06-02 ENCOUNTER — NON-APPOINTMENT (OUTPATIENT)
Age: 36
End: 2021-06-02

## 2021-06-03 ENCOUNTER — APPOINTMENT (OUTPATIENT)
Dept: PEDIATRIC ALLERGY IMMUNOLOGY | Facility: CLINIC | Age: 36
End: 2021-06-03
Payer: COMMERCIAL

## 2021-06-03 PROCEDURE — 95165 ANTIGEN THERAPY SERVICES: CPT

## 2021-06-03 PROCEDURE — 95117 IMMUNOTHERAPY INJECTIONS: CPT

## 2021-07-01 ENCOUNTER — APPOINTMENT (OUTPATIENT)
Dept: PEDIATRIC ALLERGY IMMUNOLOGY | Facility: CLINIC | Age: 36
End: 2021-07-01
Payer: COMMERCIAL

## 2021-07-01 PROCEDURE — 95117 IMMUNOTHERAPY INJECTIONS: CPT

## 2021-07-01 PROCEDURE — 95165 ANTIGEN THERAPY SERVICES: CPT

## 2021-08-05 ENCOUNTER — APPOINTMENT (OUTPATIENT)
Dept: PEDIATRIC ALLERGY IMMUNOLOGY | Facility: CLINIC | Age: 36
End: 2021-08-05
Payer: COMMERCIAL

## 2021-08-05 PROCEDURE — 95117 IMMUNOTHERAPY INJECTIONS: CPT

## 2021-08-05 PROCEDURE — 95165 ANTIGEN THERAPY SERVICES: CPT

## 2021-09-02 ENCOUNTER — APPOINTMENT (OUTPATIENT)
Dept: PEDIATRIC ALLERGY IMMUNOLOGY | Facility: CLINIC | Age: 36
End: 2021-09-02
Payer: COMMERCIAL

## 2021-09-02 PROCEDURE — 95117 IMMUNOTHERAPY INJECTIONS: CPT

## 2021-09-02 PROCEDURE — 95165 ANTIGEN THERAPY SERVICES: CPT

## 2021-09-23 ENCOUNTER — APPOINTMENT (OUTPATIENT)
Dept: GYNECOLOGIC ONCOLOGY | Facility: CLINIC | Age: 36
End: 2021-09-23
Payer: COMMERCIAL

## 2021-09-23 PROCEDURE — 99212 OFFICE O/P EST SF 10 MIN: CPT | Mod: 95

## 2021-09-24 NOTE — HISTORY OF PRESENT ILLNESS
[FreeTextEntry1] : 37 y/o G0 patient presents to discuss her concern related to minimal follicles seen on sonography during her recent ovarian stimulation cycles. Anticipate future rrBSO (plan by age 41). No partner at this time for embryos; but underwent oocyte retrieval 5 times & now has 14 total oocytes cryopreserved as of May.  Future plan for PGD. She is no longer on OCP while considering another one or two oocyte retrievals to ensure adequate number when considering loss upon thaw and need for PGD due to BRCA. EMILY care is now under Dr. Destiney Goss at [facility]. \par \par No abdominopelvic pain, abdominal bloating, abnormal vaginal bleeding. Normal bowel & bladder function.\par \par CA-125 was 10 on 5/29/21 (ref <38)\par \[facility] Continues to see her breast surgeon semi-annually (Dr. Brooks). \par \par TV Pelvic US 5/18/21: Large fibroid uterus with fibroid nodules.  Normal thickness to endometrial lining with a small amount of fluid.  Normal ovaries.\par

## 2021-09-24 NOTE — PHYSICAL EXAM
[Normal] : Mood and affect: Normal [de-identified] : Marisabel Adams MA was present the entire time of gynecological exam.

## 2021-09-24 NOTE — REASON FOR VISIT
[Home] : at home, [unfilled] , at the time of the visit. [Medical Office: ([facility])___] : at the medical office located in  [Verbal consent obtained from patient] : the patient, [unfilled] [FreeTextEntry1] : [facility] [phone] at [address] [place] [address]

## 2021-10-05 ENCOUNTER — APPOINTMENT (OUTPATIENT)
Dept: PEDIATRIC ALLERGY IMMUNOLOGY | Facility: CLINIC | Age: 36
End: 2021-10-05
Payer: COMMERCIAL

## 2021-10-05 PROCEDURE — 95165 ANTIGEN THERAPY SERVICES: CPT

## 2021-10-05 PROCEDURE — 95117 IMMUNOTHERAPY INJECTIONS: CPT

## 2021-10-07 ENCOUNTER — APPOINTMENT (OUTPATIENT)
Dept: PEDIATRIC ALLERGY IMMUNOLOGY | Facility: CLINIC | Age: 36
End: 2021-10-07

## 2021-11-04 ENCOUNTER — APPOINTMENT (OUTPATIENT)
Dept: PEDIATRIC ALLERGY IMMUNOLOGY | Facility: CLINIC | Age: 36
End: 2021-11-04
Payer: COMMERCIAL

## 2021-11-04 DIAGNOSIS — J30.89 OTHER ALLERGIC RHINITIS: ICD-10-CM

## 2021-11-04 PROCEDURE — 95117 IMMUNOTHERAPY INJECTIONS: CPT

## 2021-11-04 PROCEDURE — 95165 ANTIGEN THERAPY SERVICES: CPT

## 2021-12-02 ENCOUNTER — APPOINTMENT (OUTPATIENT)
Dept: PEDIATRIC ALLERGY IMMUNOLOGY | Facility: CLINIC | Age: 36
End: 2021-12-02

## 2021-12-06 DIAGNOSIS — J30.81 ALLERGIC RHINITIS DUE TO ANIMAL (CAT) (DOG) HAIR AND DANDER: ICD-10-CM

## 2021-12-06 DIAGNOSIS — Z51.6 ENCOUNTER FOR DESENSITIZATION TO ALLERGENS: ICD-10-CM

## 2021-12-06 DIAGNOSIS — J30.89 OTHER ALLERGIC RHINITIS: ICD-10-CM

## 2021-12-06 DIAGNOSIS — J30.1 ALLERGIC RHINITIS DUE TO POLLEN: ICD-10-CM

## 2021-12-07 DIAGNOSIS — Z87.42 PERSONAL HISTORY OF OTHER DISEASES OF THE FEMALE GENITAL TRACT: ICD-10-CM

## 2021-12-07 DIAGNOSIS — Z98.890 OTHER SPECIFIED POSTPROCEDURAL STATES: ICD-10-CM

## 2021-12-07 DIAGNOSIS — Z92.89 PERSONAL HISTORY OF OTHER MEDICAL TREATMENT: ICD-10-CM

## 2021-12-07 DIAGNOSIS — Z78.9 OTHER SPECIFIED HEALTH STATUS: ICD-10-CM

## 2021-12-07 DIAGNOSIS — Z83.3 FAMILY HISTORY OF DIABETES MELLITUS: ICD-10-CM

## 2021-12-07 DIAGNOSIS — R87.620 ATYPICAL SQUAMOUS CELLS OF UNDETERMINED SIGNIFICANCE ON CYTOLOGIC SMEAR OF VAGINA (ASC-US): ICD-10-CM

## 2021-12-07 RX ORDER — SPIRONOLACTONE 50 MG/1
TABLET ORAL
Refills: 0 | Status: ACTIVE | COMMUNITY

## 2021-12-07 RX ORDER — OMEGA-3S/DHA/EPA/FISH OIL 1000-1400
CAPSULE,DELAYED RELEASE (ENTERIC COATED) ORAL
Refills: 0 | Status: ACTIVE | COMMUNITY

## 2021-12-07 RX ORDER — NORETHINDRONE ACETATE/ETHINYL ESTRADIOL AND FERROUS FUMARATE 1MG-20(21)
KIT ORAL
Refills: 0 | Status: ACTIVE | COMMUNITY

## 2021-12-13 ENCOUNTER — APPOINTMENT (OUTPATIENT)
Dept: ULTRASOUND IMAGING | Facility: CLINIC | Age: 36
End: 2021-12-13
Payer: COMMERCIAL

## 2021-12-13 ENCOUNTER — RESULT REVIEW (OUTPATIENT)
Age: 36
End: 2021-12-13

## 2021-12-13 ENCOUNTER — APPOINTMENT (OUTPATIENT)
Dept: ULTRASOUND IMAGING | Facility: CLINIC | Age: 36
End: 2021-12-13

## 2021-12-13 ENCOUNTER — OUTPATIENT (OUTPATIENT)
Dept: OUTPATIENT SERVICES | Facility: HOSPITAL | Age: 36
LOS: 1 days | End: 2021-12-13

## 2021-12-13 DIAGNOSIS — Z98.89 OTHER SPECIFIED POSTPROCEDURAL STATES: Chronic | ICD-10-CM

## 2021-12-13 PROCEDURE — 76830 TRANSVAGINAL US NON-OB: CPT | Mod: 26

## 2021-12-13 PROCEDURE — 76856 US EXAM PELVIC COMPLETE: CPT | Mod: 26,59

## 2021-12-17 ENCOUNTER — APPOINTMENT (OUTPATIENT)
Dept: GYNECOLOGIC ONCOLOGY | Facility: CLINIC | Age: 36
End: 2021-12-17

## 2021-12-19 ENCOUNTER — FORM ENCOUNTER (OUTPATIENT)
Age: 36
End: 2021-12-19

## 2021-12-23 ENCOUNTER — TRANSCRIPTION ENCOUNTER (OUTPATIENT)
Age: 36
End: 2021-12-23

## 2022-01-19 ENCOUNTER — APPOINTMENT (OUTPATIENT)
Dept: GYNECOLOGIC ONCOLOGY | Facility: CLINIC | Age: 37
End: 2022-01-19
Payer: COMMERCIAL

## 2022-01-19 VITALS
WEIGHT: 163 LBS | SYSTOLIC BLOOD PRESSURE: 122 MMHG | BODY MASS INDEX: 26.2 KG/M2 | DIASTOLIC BLOOD PRESSURE: 58 MMHG | HEIGHT: 66 IN

## 2022-01-19 PROCEDURE — 99213 OFFICE O/P EST LOW 20 MIN: CPT

## 2022-01-20 ENCOUNTER — TRANSCRIPTION ENCOUNTER (OUTPATIENT)
Age: 37
End: 2022-01-20

## 2022-02-01 LAB — CANCER AG125 SERPL-ACNC: 12 U/ML

## 2022-02-04 ENCOUNTER — APPOINTMENT (OUTPATIENT)
Dept: OBGYN | Facility: CLINIC | Age: 37
End: 2022-02-04
Payer: COMMERCIAL

## 2022-02-04 VITALS
BODY MASS INDEX: 26.52 KG/M2 | DIASTOLIC BLOOD PRESSURE: 72 MMHG | WEIGHT: 165 LBS | SYSTOLIC BLOOD PRESSURE: 122 MMHG | HEIGHT: 66 IN

## 2022-02-04 DIAGNOSIS — Z11.3 ENCOUNTER FOR SCREENING FOR INFECTIONS WITH A PREDOMINANTLY SEXUAL MODE OF TRANSMISSION: ICD-10-CM

## 2022-02-04 DIAGNOSIS — Z11.51 ENCOUNTER FOR SCREENING FOR HUMAN PAPILLOMAVIRUS (HPV): ICD-10-CM

## 2022-02-04 DIAGNOSIS — Z01.419 ENCOUNTER FOR GYNECOLOGICAL EXAMINATION (GENERAL) (ROUTINE) W/OUT ABNORMAL FINDINGS: ICD-10-CM

## 2022-02-04 PROCEDURE — 99395 PREV VISIT EST AGE 18-39: CPT

## 2022-02-04 NOTE — HISTORY OF PRESENT ILLNESS
[FreeTextEntry1] : presents for routine gyn exam. doing well overall. h/o brca 1 and sees Dr. Jeffries every 6 months. Last pelvic sono 12/2021 showing two fibroids 8cm and 6cm. pt is s/p myomectomy x2. not in a relationship currently. has been told she has a low ovarian reserve. undergoing fertility treatments with [facility] for egg freezing. \par \par  [TextBox_4] : Annual [PapSmeardate] : 10/2020 [LMPDate] : 1/9/22

## 2022-02-04 NOTE — PLAN
[FreeTextEntry1] : routine gyn exam\par 14-16wks sized uterus\par \par sti testing today\par pap/hpv today\par discussed fertility\par f/u sono due march to f/u left ovary (has Rx)\par

## 2022-02-04 NOTE — PHYSICAL EXAM
[Examination Of The Breasts] : a normal appearance [No Masses] : no breast masses were palpable [Labia Majora] : normal [Labia Minora] : normal [Normal] : normal [Enlarged ___ wks] : enlarged [unfilled] ~Uweeks [Uterine Adnexae] : normal

## 2022-02-09 ENCOUNTER — NON-APPOINTMENT (OUTPATIENT)
Age: 37
End: 2022-02-09

## 2022-02-09 LAB
C TRACH RRNA SPEC QL NAA+PROBE: NOT DETECTED
HBV SURFACE AG SER QL: NONREACTIVE
HCV AB SER QL: NONREACTIVE
HCV S/CO RATIO: 0.22 S/CO
HIV1+2 AB SPEC QL IA.RAPID: NONREACTIVE
HPV HIGH+LOW RISK DNA PNL CVX: NOT DETECTED
N GONORRHOEA RRNA SPEC QL NAA+PROBE: NOT DETECTED
SOURCE AMPLIFICATION: NORMAL
T PALLIDUM AB SER QL IA: NEGATIVE

## 2022-02-10 LAB — CYTOLOGY CVX/VAG DOC THIN PREP: ABNORMAL

## 2022-02-11 ENCOUNTER — NON-APPOINTMENT (OUTPATIENT)
Age: 37
End: 2022-02-11

## 2022-02-22 NOTE — HISTORY OF PRESENT ILLNESS
[FreeTextEntry1] : 34 y/o patient with BRCA 1(+) mutation, LMP 1/9/22, anticipating future risk reducing BSO. Patient presented via TEB in 9/2021  to discuss her concern related to minimal follicles seen on sonography during her recent ovarian stimulation cycles. She underwent the last oocyte retrieval 12/2, now done 6 times -- she now has 25 total oocytes cryopreserved as of December/2021. She hopes to have 1-2 children. Future plan for PGD. I advised the patient to return in 3 months with US completed prior to her visit. Today she denies persistent abdominal bloating, abdominopelvic pain. She reports normal bowel/bladder functions. \par \par She follows with Dr. Perry/GYN. She has had trouble obtaining an appointment and instead will see her colleage on 2/4. \par \par EMILY care is under Dr. Destiney Goss at [facility].\par \par Pelvic/TVUS (12/13/21):\par Large fibroids within uterus, as described above.\par Segmental visualization of the endometrium is within normal limits however further evaluation to include a pelvic MRI may be in order for more complete evaluation as clinically indicated.\par Right ovary not visualized with mild free fluid around the right adnexa.\par Prominent sized left ovary demonstrating a volume of 21.3 mL. No sonographic evidence of cystic or solid ovarian masses.

## 2022-02-22 NOTE — ASSESSMENT
[FreeTextEntry1] : I reviewed ultrasound with pt - ovarian volume size large on ultrasound due to prior retrieval - repeat US prior to her next visit in March to reevaluate ovarian volume. If persistently enlarged, consider MRI. \par \par I discussed an early [place] trial for only BRCA1 + patients to undergo BS prior to oophorectomy. Anticipating plan to open a similar study locally, I advised the patient that she should consider this trial. Having BS before oophorectomy will preserve patient's hormonal functions given her young age & even without her tubes, patient should be able to carry a pregnancy to term naturally. She is a great candidate for this study given constant supervision. She is interested in this study and would like to have literature to review.

## 2022-02-22 NOTE — END OF VISIT
[FreeTextEntry3] : Written by Marisabel Adams, acting as a scribe for Dr. Margaret Jeffries.\par This note accurately reflects the work and decisions made by me.

## 2022-02-22 NOTE — PHYSICAL EXAM
[Normal] : Mood and affect: Normal [de-identified] : laparoscopic/low transverse scars [de-identified] : some uterine fibroids [de-identified] : Marisabel Adams MA was present the entire time of gynecological exam.

## 2022-02-22 NOTE — REASON FOR VISIT
[FreeTextEntry1] : [place] Location\par \par [facility] Physician [facility] Gynecologic Oncology [phone] at [address] [place] [address] \par \par BRCA 1(+) mutation surveillance

## 2022-03-01 ENCOUNTER — RESULT REVIEW (OUTPATIENT)
Age: 37
End: 2022-03-01

## 2022-03-01 ENCOUNTER — APPOINTMENT (OUTPATIENT)
Dept: ULTRASOUND IMAGING | Facility: CLINIC | Age: 37
End: 2022-03-01
Payer: COMMERCIAL

## 2022-03-01 ENCOUNTER — OUTPATIENT (OUTPATIENT)
Dept: OUTPATIENT SERVICES | Facility: HOSPITAL | Age: 37
LOS: 1 days | End: 2022-03-01

## 2022-03-01 DIAGNOSIS — Z98.89 OTHER SPECIFIED POSTPROCEDURAL STATES: Chronic | ICD-10-CM

## 2022-03-01 PROCEDURE — 76830 TRANSVAGINAL US NON-OB: CPT | Mod: 26

## 2022-03-01 PROCEDURE — 76856 US EXAM PELVIC COMPLETE: CPT | Mod: 26,59

## 2022-03-07 ENCOUNTER — TRANSCRIPTION ENCOUNTER (OUTPATIENT)
Age: 37
End: 2022-03-07

## 2022-03-15 ENCOUNTER — APPOINTMENT (OUTPATIENT)
Dept: OBGYN | Facility: CLINIC | Age: 37
End: 2022-03-15

## 2022-03-25 ENCOUNTER — LABORATORY RESULT (OUTPATIENT)
Age: 37
End: 2022-03-25

## 2022-03-25 ENCOUNTER — APPOINTMENT (OUTPATIENT)
Dept: OBGYN | Facility: CLINIC | Age: 37
End: 2022-03-25
Payer: COMMERCIAL

## 2022-03-25 VITALS
WEIGHT: 165 LBS | HEIGHT: 66 IN | SYSTOLIC BLOOD PRESSURE: 112 MMHG | BODY MASS INDEX: 26.52 KG/M2 | DIASTOLIC BLOOD PRESSURE: 72 MMHG

## 2022-03-25 DIAGNOSIS — R87.612 LOW GRADE SQUAMOUS INTRAEPITHELIAL LESION ON CYTOLOGIC SMEAR OF CERVIX (LGSIL): ICD-10-CM

## 2022-03-25 PROCEDURE — 57454 BX/CURETT OF CERVIX W/SCOPE: CPT

## 2022-04-05 ENCOUNTER — NON-APPOINTMENT (OUTPATIENT)
Age: 37
End: 2022-04-05

## 2022-04-05 PROBLEM — R87.612 LOW GRADE SQUAMOUS INTRAEPITHELIAL LESION ON CYTOLOGIC SMEAR OF CERVIX (LGSIL): Status: ACTIVE | Noted: 2022-03-25

## 2022-04-05 NOTE — PROCEDURE
[Colposcopy] : Colposcopy  [Time out performed] : Pre-procedure time out performed.  Patient's name, date of birth and procedure confirmed. [Consent Obtained] : Consent obtained [Risks] : risks [Benefits] : benefits [Alternatives] : alternatives [LGSIL] : LGSIL [No Premedication] : no premedication [Colposcopy Adequate] : colposcopy adequate [SCI Fully Visualized] : SCI fully visualized [ECC Performed] : ECC performed [Biopsy] : biopsy taken [Hemostasis Obtained] : Hemostasis obtained [Tolerated Well] : the patient tolerated the procedure well [de-identified] : 3 [de-identified] : mild acetowhite changes at 3 and 11 oclock [de-identified] : 3 oclock\par 11 oclock\par ECC

## 2022-04-18 ENCOUNTER — RESULT REVIEW (OUTPATIENT)
Age: 37
End: 2022-04-18

## 2022-04-18 ENCOUNTER — APPOINTMENT (OUTPATIENT)
Dept: MRI IMAGING | Facility: CLINIC | Age: 37
End: 2022-04-18
Payer: COMMERCIAL

## 2022-04-18 ENCOUNTER — OUTPATIENT (OUTPATIENT)
Dept: OUTPATIENT SERVICES | Facility: HOSPITAL | Age: 37
LOS: 1 days | End: 2022-04-18

## 2022-04-18 DIAGNOSIS — Z98.89 OTHER SPECIFIED POSTPROCEDURAL STATES: Chronic | ICD-10-CM

## 2022-04-18 PROCEDURE — 72197 MRI PELVIS W/O & W/DYE: CPT | Mod: 26

## 2022-06-13 ENCOUNTER — NON-APPOINTMENT (OUTPATIENT)
Age: 37
End: 2022-06-13

## 2022-07-06 ENCOUNTER — APPOINTMENT (OUTPATIENT)
Dept: GYNECOLOGIC ONCOLOGY | Facility: CLINIC | Age: 37
End: 2022-07-06

## 2022-07-06 ENCOUNTER — OUTPATIENT (OUTPATIENT)
Dept: OUTPATIENT SERVICES | Facility: HOSPITAL | Age: 37
LOS: 1 days | End: 2022-07-06
Payer: COMMERCIAL

## 2022-07-06 VITALS
HEIGHT: 66 IN | TEMPERATURE: 98 F | RESPIRATION RATE: 14 BRPM | HEART RATE: 86 BPM | WEIGHT: 167.99 LBS | SYSTOLIC BLOOD PRESSURE: 121 MMHG | DIASTOLIC BLOOD PRESSURE: 75 MMHG | OXYGEN SATURATION: 98 %

## 2022-07-06 VITALS
WEIGHT: 158 LBS | SYSTOLIC BLOOD PRESSURE: 124 MMHG | DIASTOLIC BLOOD PRESSURE: 70 MMHG | BODY MASS INDEX: 25.39 KG/M2 | HEIGHT: 66 IN

## 2022-07-06 DIAGNOSIS — D25.9 LEIOMYOMA OF UTERUS, UNSPECIFIED: ICD-10-CM

## 2022-07-06 DIAGNOSIS — Z98.890 OTHER SPECIFIED POSTPROCEDURAL STATES: Chronic | ICD-10-CM

## 2022-07-06 DIAGNOSIS — Z01.818 ENCOUNTER FOR OTHER PREPROCEDURAL EXAMINATION: ICD-10-CM

## 2022-07-06 DIAGNOSIS — Z98.89 OTHER SPECIFIED POSTPROCEDURAL STATES: Chronic | ICD-10-CM

## 2022-07-06 DIAGNOSIS — Z29.9 ENCOUNTER FOR PROPHYLACTIC MEASURES, UNSPECIFIED: ICD-10-CM

## 2022-07-06 DIAGNOSIS — Z90.13 ACQUIRED ABSENCE OF BILATERAL BREASTS AND NIPPLES: Chronic | ICD-10-CM

## 2022-07-06 LAB
ANION GAP SERPL CALC-SCNC: 10 MMOL/L — SIGNIFICANT CHANGE UP (ref 5–17)
BLD GP AB SCN SERPL QL: NEGATIVE — SIGNIFICANT CHANGE UP
BUN SERPL-MCNC: 10 MG/DL — SIGNIFICANT CHANGE UP (ref 7–23)
CALCIUM SERPL-MCNC: 9.3 MG/DL — SIGNIFICANT CHANGE UP (ref 8.4–10.5)
CANCER AG125 SERPL-ACNC: 13 U/ML
CHLORIDE SERPL-SCNC: 104 MMOL/L — SIGNIFICANT CHANGE UP (ref 96–108)
CO2 SERPL-SCNC: 25 MMOL/L — SIGNIFICANT CHANGE UP (ref 22–31)
CREAT SERPL-MCNC: 0.64 MG/DL — SIGNIFICANT CHANGE UP (ref 0.5–1.3)
EGFR: 117 ML/MIN/1.73M2 — SIGNIFICANT CHANGE UP
GLUCOSE SERPL-MCNC: 97 MG/DL — SIGNIFICANT CHANGE UP (ref 70–99)
HCT VFR BLD CALC: 38.3 % — SIGNIFICANT CHANGE UP (ref 34.5–45)
HGB BLD-MCNC: 12.6 G/DL — SIGNIFICANT CHANGE UP (ref 11.5–15.5)
MCHC RBC-ENTMCNC: 32.9 GM/DL — SIGNIFICANT CHANGE UP (ref 32–36)
MCHC RBC-ENTMCNC: 34.8 PG — HIGH (ref 27–34)
MCV RBC AUTO: 105.8 FL — HIGH (ref 80–100)
NRBC # BLD: 0 /100 WBCS — SIGNIFICANT CHANGE UP (ref 0–0)
PLATELET # BLD AUTO: 194 K/UL — SIGNIFICANT CHANGE UP (ref 150–400)
POTASSIUM SERPL-MCNC: 3.9 MMOL/L — SIGNIFICANT CHANGE UP (ref 3.5–5.3)
POTASSIUM SERPL-SCNC: 3.9 MMOL/L — SIGNIFICANT CHANGE UP (ref 3.5–5.3)
RBC # BLD: 3.62 M/UL — LOW (ref 3.8–5.2)
RBC # FLD: 11.4 % — SIGNIFICANT CHANGE UP (ref 10.3–14.5)
RH IG SCN BLD-IMP: POSITIVE — SIGNIFICANT CHANGE UP
SODIUM SERPL-SCNC: 139 MMOL/L — SIGNIFICANT CHANGE UP (ref 135–145)
WBC # BLD: 3.54 K/UL — LOW (ref 3.8–10.5)
WBC # FLD AUTO: 3.54 K/UL — LOW (ref 3.8–10.5)

## 2022-07-06 PROCEDURE — G0463: CPT

## 2022-07-06 PROCEDURE — 80048 BASIC METABOLIC PNL TOTAL CA: CPT

## 2022-07-06 PROCEDURE — 86901 BLOOD TYPING SEROLOGIC RH(D): CPT

## 2022-07-06 PROCEDURE — 99214 OFFICE O/P EST MOD 30 MIN: CPT

## 2022-07-06 PROCEDURE — 99441: CPT

## 2022-07-06 PROCEDURE — 86850 RBC ANTIBODY SCREEN: CPT

## 2022-07-06 PROCEDURE — 86900 BLOOD TYPING SEROLOGIC ABO: CPT

## 2022-07-06 PROCEDURE — 85027 COMPLETE CBC AUTOMATED: CPT

## 2022-07-06 RX ORDER — LEVOCETIRIZINE DIHYDROCHLORIDE 0.5 MG/ML
1 SOLUTION ORAL
Qty: 0 | Refills: 0 | DISCHARGE

## 2022-07-06 RX ORDER — UBIDECARENONE 100 MG
1 CAPSULE ORAL
Qty: 0 | Refills: 0 | DISCHARGE

## 2022-07-06 RX ORDER — ASCORBIC ACID 60 MG
1 TABLET,CHEWABLE ORAL
Qty: 0 | Refills: 0 | DISCHARGE

## 2022-07-06 RX ORDER — CHOLECALCIFEROL (VITAMIN D3) 125 MCG
1 CAPSULE ORAL
Qty: 0 | Refills: 0 | DISCHARGE

## 2022-07-06 RX ORDER — SPIRONOLACTONE 25 MG/1
1 TABLET, FILM COATED ORAL
Qty: 0 | Refills: 0 | DISCHARGE

## 2022-07-06 RX ORDER — CEFOTETAN DISODIUM 1 G
2 VIAL (EA) INJECTION ONCE
Refills: 0 | Status: DISCONTINUED | OUTPATIENT
Start: 2022-07-26 | End: 2022-07-28

## 2022-07-06 NOTE — H&P PST ADULT - NSANTHOSAYNRD_GEN_A_CORE
No. ARIK screening performed.  STOP BANG Legend: 0-2 = LOW Risk; 3-4 = INTERMEDIATE Risk; 5-8 = HIGH Risk

## 2022-07-06 NOTE — H&P PST ADULT - HISTORY OF PRESENT ILLNESS
38 y/o patient with BRCA 1(+) mutation, LMP 1/9/22, anticipating future risk reducing BSO. Following patient's US in December showing large ovarian size s/p egg retrieval, I recommended repeat pelvic US done prior to next egg retrieval as to reevaluate size. Given it's persistent enlarged size, unchanged from December US, MRI pelvis was ordered for further evaluation by Dr. Deedee Baca/GYN. Please see below imaging report impressions. Today she denies persistent abdominal bloating, abdominopelvic pain. She reports normal bowel/bladder functions.     She underwent the last oocyte retrieval 12/2, now done 6 times -- she now has 25 total oocytes cryopreserved as of December/2021. She hopes to have 1-2 children. Future plan for PGD.      38 yo female. . LMP 2022.  PMH BRCA positive (s/p prophylaxis bilateral mastectomies and reconstructive breasts surgery 2017.  h/o uterine fibroids, s/p myomectomies.  currently under infertility care, planning for future PGD. s/p egg retrieval recently, GYN sono revealed uterine leiomyoma, c/o abd bloating, evaluated by Dr. Baca, now presents to [facility] scheduled for abd myomectomy on .  covid test scheduled on  at [facility].       38 y/o patient with BRCA 1(+) mutation, LMP 22, anticipating future risk reducing BSO. Following patient's US in December showing large ovarian size s/p egg retrieval, I recommended repeat pelvic US done prior to next egg retrieval as to reevaluate size. Given it's persistent enlarged size, unchanged from December US, MRI pelvis was ordered for further evaluation by Dr. Deedee Baca/GYN. Please see below imaging report impressions. Today she denies persistent abdominal bloating, abdominopelvic pain. She reports normal bowel/bladder functions.     She underwent the last oocyte retrieval , now done 6 times -- she now has 25 total oocytes cryopreserved as of 2021. She hopes to have 1-2 children. Future plan for PGD.

## 2022-07-06 NOTE — H&P PST ADULT - NSICDXPASTSURGICALHX_GEN_ALL_CORE_FT
PAST SURGICAL HISTORY:  H/O bilateral mastectomy with PAP flap, breast reconstruction and scar revision in 2017    H/O myomectomy 2011, 2015     PAST SURGICAL HISTORY:  H/O bilateral mastectomy with PAP flap, breast reconstruction and scar revision in 2017    H/O myomectomy 2011, 2015    S/P laparoscopy egg retrieval

## 2022-07-06 NOTE — H&P PST ADULT - NSICDXPASTMEDICALHX_GEN_ALL_CORE_FT
PAST MEDICAL HISTORY:  Allergic rhinitis, seasonal     BRCA positive s/p prophylactive mastectomies and reconstructive breasts surgery 2017, 2019    H/O acne     Leukopenia evaluated by hematologist in the past, no intervention, only monitoring    Uterine leiomyoma      PAST MEDICAL HISTORY:  Allergic rhinitis, seasonal     BRCA positive BRCA1 mutation, s/p prophylactive mastectomies and reconstructive breasts surgery 2017, 2019    H/O acne     Leukopenia evaluated by hematologist in the past, no intervention, only monitoring    Uterine leiomyoma

## 2022-07-06 NOTE — H&P PST ADULT - ASSESSMENT

## 2022-07-06 NOTE — H&P PST ADULT - ATTENDING COMMENTS
pt seen and plan discussed. to proceed with scheduled procedure. informed consent signed and witnessed.     lionel olsen

## 2022-07-07 PROBLEM — J30.2 OTHER SEASONAL ALLERGIC RHINITIS: Chronic | Status: ACTIVE | Noted: 2022-07-06

## 2022-07-07 PROBLEM — Z87.2 PERSONAL HISTORY OF DISEASES OF THE SKIN AND SUBCUTANEOUS TISSUE: Chronic | Status: ACTIVE | Noted: 2022-07-06

## 2022-07-07 PROBLEM — Z15.01 GENETIC SUSCEPTIBILITY TO MALIGNANT NEOPLASM OF BREAST: Chronic | Status: ACTIVE | Noted: 2022-07-06

## 2022-07-07 PROBLEM — D72.819 DECREASED WHITE BLOOD CELL COUNT, UNSPECIFIED: Chronic | Status: ACTIVE | Noted: 2022-07-06

## 2022-07-15 NOTE — PHYSICAL EXAM
[Normal] : Recto-Vaginal Exam: Normal [FreeTextEntry1] : Marisabel Adams MA was present the entire time of gynecological exam.  [de-identified] : low transverse scar [de-identified] : 16 week sized fibroid uterus, non-tender. [de-identified] : No RV nodularity, no cul-de-sac mass.

## 2022-07-15 NOTE — ASSESSMENT
[FreeTextEntry1] : Telehealth apt to further discuss prophylactic BSO in the absence of trying to conceive spontaneously.

## 2022-07-15 NOTE — HISTORY OF PRESENT ILLNESS
[FreeTextEntry1] : 38 y/o patient with BRCA 1(+) mutation, LMP 1/9/22, anticipating future risk reducing BSO. Following patient's US in December showing large ovarian size s/p egg retrieval, I recommended repeat pelvic US done prior to next egg retrieval as to reevaluate size. Given it's persistent enlarged size, unchanged from December US, MRI pelvis was ordered for further evaluation by Dr. Deedee Baca/GYN. Please see below imaging report impressions. Today she denies persistent abdominal bloating, abdominopelvic pain. She reports normal bowel/bladder functions. \par \par EMILY care is under Dr. Destiney Goss at [facility], last seen on 7/1 for egg retrieval - 30 total oocytes cryopreserved as of today. She hopes to have 1-2 children. She is scheduled for a abdominal myomectomy on 7/26.\par \par She follows with Dr. Deedee Baca/GYN last seen on 2/4/22. \par Pap smear (2/4/22) - LSIL, HR HPV negative\par \par Colposopy (3/25/22) - mild acetowhite changes at 3 and 11 oclock noted\par 1 . Cervix, 3 oclock, biopsy:\par - Focal Low grade cervical intraepithelial neoplasia grade 1 (mild dysplasia/LYUDMILA-1)\par \par 2. Cervix, 11 oclock, biopsy:\par - Low grade cervical intraepithelial neoplasia grade 1 (mild dysplasia/LYUDMILA-1)\par \par 3. ECC:\par - Fragments of squamous and endocervical epithelium\par - Negative for dysplasia\par \par  (1/19/22) - 12\par \par Pelvic/TVUS (3/1/22) - \par Enlarged multi fibroid uterus as seen on prior studies. No ovarian mass.\par \par MRI pelvis (4/18/22) - Enlarged lobulated uterus. Numerous enhancing fundal, body and lower uterine segment leiomyomata. There are at least 15 leiomyomata present.

## 2022-07-15 NOTE — REASON FOR VISIT
[FreeTextEntry1] : [place] Location\par \par [facility] Physician [facility] Gynecologic Oncology [phone] at [address] [place] [address] \par \par BRCA 1(+) mutation surveillance

## 2022-07-25 ENCOUNTER — TRANSCRIPTION ENCOUNTER (OUTPATIENT)
Age: 37
End: 2022-07-25

## 2022-07-26 ENCOUNTER — APPOINTMENT (OUTPATIENT)
Dept: OBGYN | Facility: HOSPITAL | Age: 37
End: 2022-07-26

## 2022-07-26 ENCOUNTER — TRANSCRIPTION ENCOUNTER (OUTPATIENT)
Age: 37
End: 2022-07-26

## 2022-07-26 ENCOUNTER — INPATIENT (INPATIENT)
Facility: HOSPITAL | Age: 37
LOS: 1 days | Discharge: HOME | DRG: 742 | End: 2022-07-28
Attending: STUDENT IN AN ORGANIZED HEALTH CARE EDUCATION/TRAINING PROGRAM | Admitting: STUDENT IN AN ORGANIZED HEALTH CARE EDUCATION/TRAINING PROGRAM
Payer: COMMERCIAL

## 2022-07-26 VITALS
HEIGHT: 65.98 IN | HEART RATE: 85 BPM | RESPIRATION RATE: 16 BRPM | SYSTOLIC BLOOD PRESSURE: 104 MMHG | DIASTOLIC BLOOD PRESSURE: 70 MMHG | TEMPERATURE: 99 F | OXYGEN SATURATION: 99 % | WEIGHT: 167.99 LBS

## 2022-07-26 DIAGNOSIS — D25.9 LEIOMYOMA OF UTERUS, UNSPECIFIED: ICD-10-CM

## 2022-07-26 DIAGNOSIS — Z90.13 ACQUIRED ABSENCE OF BILATERAL BREASTS AND NIPPLES: Chronic | ICD-10-CM

## 2022-07-26 DIAGNOSIS — Z98.890 OTHER SPECIFIED POSTPROCEDURAL STATES: Chronic | ICD-10-CM

## 2022-07-26 DIAGNOSIS — Z98.89 OTHER SPECIFIED POSTPROCEDURAL STATES: Chronic | ICD-10-CM

## 2022-07-26 LAB — HCG UR QL: NEGATIVE — SIGNIFICANT CHANGE UP

## 2022-07-26 PROCEDURE — 58146 MYOMECTOMY ABDOM COMPLEX: CPT

## 2022-07-26 PROCEDURE — 88305 TISSUE EXAM BY PATHOLOGIST: CPT | Mod: 26

## 2022-07-26 RX ORDER — SODIUM CHLORIDE 9 MG/ML
1000 INJECTION, SOLUTION INTRAVENOUS
Refills: 0 | Status: DISCONTINUED | OUTPATIENT
Start: 2022-07-26 | End: 2022-07-26

## 2022-07-26 RX ORDER — SIMETHICONE 80 MG/1
80 TABLET, CHEWABLE ORAL EVERY 8 HOURS
Refills: 0 | Status: DISCONTINUED | OUTPATIENT
Start: 2022-07-26 | End: 2022-07-28

## 2022-07-26 RX ORDER — ONDANSETRON 8 MG/1
4 TABLET, FILM COATED ORAL EVERY 6 HOURS
Refills: 0 | Status: DISCONTINUED | OUTPATIENT
Start: 2022-07-26 | End: 2022-07-28

## 2022-07-26 RX ORDER — HYDROMORPHONE HYDROCHLORIDE 2 MG/ML
0.5 INJECTION INTRAMUSCULAR; INTRAVENOUS; SUBCUTANEOUS
Refills: 0 | Status: DISCONTINUED | OUTPATIENT
Start: 2022-07-26 | End: 2022-07-26

## 2022-07-26 RX ORDER — SODIUM CHLORIDE 9 MG/ML
1000 INJECTION, SOLUTION INTRAVENOUS
Refills: 0 | Status: DISCONTINUED | OUTPATIENT
Start: 2022-07-26 | End: 2022-07-27

## 2022-07-26 RX ORDER — ONDANSETRON 8 MG/1
4 TABLET, FILM COATED ORAL ONCE
Refills: 0 | Status: DISCONTINUED | OUTPATIENT
Start: 2022-07-26 | End: 2022-07-26

## 2022-07-26 RX ORDER — CELECOXIB 200 MG/1
400 CAPSULE ORAL ONCE
Refills: 0 | Status: COMPLETED | OUTPATIENT
Start: 2022-07-26 | End: 2022-07-26

## 2022-07-26 RX ORDER — OXYCODONE HYDROCHLORIDE 5 MG/1
5 TABLET ORAL EVERY 4 HOURS
Refills: 0 | Status: DISCONTINUED | OUTPATIENT
Start: 2022-07-26 | End: 2022-07-28

## 2022-07-26 RX ORDER — HEPARIN SODIUM 5000 [USP'U]/ML
5000 INJECTION INTRAVENOUS; SUBCUTANEOUS EVERY 12 HOURS
Refills: 0 | Status: DISCONTINUED | OUTPATIENT
Start: 2022-07-26 | End: 2022-07-28

## 2022-07-26 RX ORDER — KETOROLAC TROMETHAMINE 30 MG/ML
15 SYRINGE (ML) INJECTION EVERY 6 HOURS
Refills: 0 | Status: DISCONTINUED | OUTPATIENT
Start: 2022-07-26 | End: 2022-07-27

## 2022-07-26 RX ORDER — ACETAMINOPHEN 500 MG
975 TABLET ORAL EVERY 6 HOURS
Refills: 0 | Status: DISCONTINUED | OUTPATIENT
Start: 2022-07-26 | End: 2022-07-28

## 2022-07-26 RX ORDER — CHLORHEXIDINE GLUCONATE 213 G/1000ML
1 SOLUTION TOPICAL ONCE
Refills: 0 | Status: COMPLETED | OUTPATIENT
Start: 2022-07-26 | End: 2022-07-26

## 2022-07-26 RX ORDER — GABAPENTIN 400 MG/1
600 CAPSULE ORAL ONCE
Refills: 0 | Status: COMPLETED | OUTPATIENT
Start: 2022-07-26 | End: 2022-07-26

## 2022-07-26 RX ORDER — SODIUM CHLORIDE 9 MG/ML
3 INJECTION INTRAMUSCULAR; INTRAVENOUS; SUBCUTANEOUS EVERY 8 HOURS
Refills: 0 | Status: DISCONTINUED | OUTPATIENT
Start: 2022-07-26 | End: 2022-07-26

## 2022-07-26 RX ORDER — ACETAMINOPHEN 500 MG
1000 TABLET ORAL ONCE
Refills: 0 | Status: COMPLETED | OUTPATIENT
Start: 2022-07-26 | End: 2022-07-26

## 2022-07-26 RX ORDER — LIDOCAINE HCL 20 MG/ML
0.2 VIAL (ML) INJECTION ONCE
Refills: 0 | Status: DISCONTINUED | OUTPATIENT
Start: 2022-07-26 | End: 2022-07-26

## 2022-07-26 RX ORDER — ACETAMINOPHEN 500 MG
100 TABLET ORAL ONCE
Refills: 0 | Status: DISCONTINUED | OUTPATIENT
Start: 2022-07-26 | End: 2022-07-26

## 2022-07-26 RX ADMIN — Medication 15 MILLIGRAM(S): at 21:00

## 2022-07-26 RX ADMIN — HYDROMORPHONE HYDROCHLORIDE 0.5 MILLIGRAM(S): 2 INJECTION INTRAMUSCULAR; INTRAVENOUS; SUBCUTANEOUS at 16:15

## 2022-07-26 RX ADMIN — SIMETHICONE 80 MILLIGRAM(S): 80 TABLET, CHEWABLE ORAL at 23:31

## 2022-07-26 RX ADMIN — SIMETHICONE 80 MILLIGRAM(S): 80 TABLET, CHEWABLE ORAL at 15:58

## 2022-07-26 RX ADMIN — CHLORHEXIDINE GLUCONATE 1 APPLICATION(S): 213 SOLUTION TOPICAL at 08:29

## 2022-07-26 RX ADMIN — SODIUM CHLORIDE 75 MILLILITER(S): 9 INJECTION, SOLUTION INTRAVENOUS at 13:44

## 2022-07-26 RX ADMIN — HYDROMORPHONE HYDROCHLORIDE 0.5 MILLIGRAM(S): 2 INJECTION INTRAMUSCULAR; INTRAVENOUS; SUBCUTANEOUS at 16:03

## 2022-07-26 RX ADMIN — HEPARIN SODIUM 5000 UNIT(S): 5000 INJECTION INTRAVENOUS; SUBCUTANEOUS at 21:38

## 2022-07-26 RX ADMIN — Medication 1000 MILLIGRAM(S): at 08:30

## 2022-07-26 RX ADMIN — Medication 975 MILLIGRAM(S): at 18:15

## 2022-07-26 RX ADMIN — ONDANSETRON 4 MILLIGRAM(S): 8 TABLET, FILM COATED ORAL at 20:16

## 2022-07-26 RX ADMIN — Medication 975 MILLIGRAM(S): at 23:30

## 2022-07-26 RX ADMIN — CELECOXIB 400 MILLIGRAM(S): 200 CAPSULE ORAL at 08:30

## 2022-07-26 RX ADMIN — Medication 975 MILLIGRAM(S): at 18:05

## 2022-07-26 RX ADMIN — GABAPENTIN 600 MILLIGRAM(S): 400 CAPSULE ORAL at 08:30

## 2022-07-26 RX ADMIN — Medication 15 MILLIGRAM(S): at 20:16

## 2022-07-26 NOTE — BRIEF OPERATIVE NOTE - OPERATION/FINDINGS
~14 week size multifibroid uterus. Numerous anterior and posterior FIGO type 5/6 myomas ~3-4cm in sized. Smaller ~2-3cm FIGO type 4 myomas.  4cm right broad ligament exophilic myoma. Endometrial cavity entered for 2-3cm FIGO type 1 myoma. Defect is 1cm at right fundal region. A total of ~23 myomas were removed.

## 2022-07-26 NOTE — PATIENT PROFILE ADULT - FALL HARM RISK - UNIVERSAL INTERVENTIONS
Bed in lowest position, wheels locked, appropriate side rails in place/Call bell, personal items and telephone in reach/Instruct patient to call for assistance before getting out of bed or chair/Non-slip footwear when patient is out of bed/[place] to call system/Physically safe environment - no spills, clutter or unnecessary equipment/Purposeful Proactive Rounding/Room/bathroom lighting operational, light cord in reach

## 2022-07-26 NOTE — CHART NOTE - NSCHARTNOTEFT_GEN_A_CORE
R2 OBGYN POST-OP CHECK    S: Patient seen and evaluated at bedside.  Pt sleeping, easily arousable. Patient reports pain controlled with analgesia. Pt denies N/V, SOB, CP, palpitations, fever/chills. Has not had anything to drink or eat because she has been sleeping.  Not OOB yet.    O:   T(C): 36.3 (07-26-22 @ 13:20), Max: 36.3 (07-26-22 @ 13:20)  HR: 77 (07-26-22 @ 15:15) (74 - 78)  BP: 106/58 (07-26-22 @ 15:15) (92/51 - 109/62)  RR: 16 (07-26-22 @ 15:15) (16 - 16)  SpO2: 100% (07-26-22 @ 15:15) (100% - 100%)  Wt(kg): --  I&O's Summary    26 Jul 2022 07:01  -  26 Jul 2022 15:55  --------------------------------------------------------  IN: 75 mL / OUT: 30 mL / NET: 45 mL        Gen: Resting comfortably in bed, NAD  CV: S1S2, RRR  Lungs: CTA B/L  Abd: soft, appropriately tender, occasional BS x 4 quadrants.    Inc: Low transverse incision clean/dry/intact w/ obsite in place  Ext: SCD's in place and functional, non-tender b/l, no edema        A/P: 37y Female s/p abdominal myomectomy. Patient is stable and doing well.    Neuro: PO Analgesia PRN  CV: Hemodynamically stable. Monitor VS. CBC in AM.  Pulm: Saturating well on room air.  Encourage OOB and incentive spirometer use.   GI: Advance to regular diet. Anti-emetics PRN.  : Carias to gravity, UOP adequate at 75cc in 2 hrs. Remove Carias at 6pm and DTV @2a  FEN: Electrolytes: LR@75cc/hr.  Heme: DVT ppx w/ SCD's while in bed. Early ambulation, initially with assistance then as tolerated.  Continue HSQ   ID: Afebrile  Endo: No active issues   Dispo: PACU to floor bed when criteria met.     YELITZA Daly, PGY2

## 2022-07-26 NOTE — PATIENT PROFILE ADULT - STATED REASON FOR ADMISSION
Debo YDaria Marciano  Preferred Name:   None  Female, 51 year old, 1968  Phone:   [phone] (M)  Last Weight:   125.6 kg  PCP:   Scarlett Rand MD  Allergies:   Diflucan  Primary Ins:   ANTHEM  MRN:   [id number]  Patient Portal:   Active  Last Logon:   19  Next Appt:   None  Last Appt With Me:   Hx:     OAC reschedule with Dr Cee at [facility] 19   Received: Today   Message Contents   Marianne COELHO [facility] [place] Scheduling Pool; P  Preauth Pool   Cc: Marianne Guido             Hi     Please review the changes below:     Procedure: oac Dr Cee     Current date: 19   Current time:   Current facility: [facility]     Rescheduled date: 10/04/19   Rescheduled time:   Rescheduled facility: [facility]   If facility changed, new case created?: NA       Thanks,   GI Surgery Scheduling Team         I am here for abdominal myomectomy.

## 2022-07-26 NOTE — BRIEF OPERATIVE NOTE - NSICDXBRIEFPROCEDURE_GEN_ALL_CORE_FT
PROCEDURES:  Surgical removal of 5 or more myomas by abdominal approach 26-Jul-2022 12:54:37  Sara Shane

## 2022-07-27 ENCOUNTER — TRANSCRIPTION ENCOUNTER (OUTPATIENT)
Age: 37
End: 2022-07-27

## 2022-07-27 LAB
ANION GAP SERPL CALC-SCNC: 7 MMOL/L — SIGNIFICANT CHANGE UP (ref 5–17)
BASOPHILS # BLD AUTO: 0.07 K/UL — SIGNIFICANT CHANGE UP (ref 0–0.2)
BASOPHILS NFR BLD AUTO: 0.9 % — SIGNIFICANT CHANGE UP (ref 0–2)
BUN SERPL-MCNC: 8 MG/DL — SIGNIFICANT CHANGE UP (ref 7–23)
CALCIUM SERPL-MCNC: 8 MG/DL — LOW (ref 8.4–10.5)
CHLORIDE SERPL-SCNC: 102 MMOL/L — SIGNIFICANT CHANGE UP (ref 96–108)
CO2 SERPL-SCNC: 28 MMOL/L — SIGNIFICANT CHANGE UP (ref 22–31)
CREAT SERPL-MCNC: 0.77 MG/DL — SIGNIFICANT CHANGE UP (ref 0.5–1.3)
EGFR: 102 ML/MIN/1.73M2 — SIGNIFICANT CHANGE UP
EOSINOPHIL # BLD AUTO: 0 K/UL — SIGNIFICANT CHANGE UP (ref 0–0.5)
EOSINOPHIL NFR BLD AUTO: 0 % — SIGNIFICANT CHANGE UP (ref 0–6)
GLUCOSE SERPL-MCNC: 103 MG/DL — HIGH (ref 70–99)
HCT VFR BLD CALC: 32.9 % — LOW (ref 34.5–45)
HGB BLD-MCNC: 10.9 G/DL — LOW (ref 11.5–15.5)
LYMPHOCYTES # BLD AUTO: 1.4 K/UL — SIGNIFICANT CHANGE UP (ref 1–3.3)
LYMPHOCYTES # BLD AUTO: 17.5 % — SIGNIFICANT CHANGE UP (ref 13–44)
MANUAL SMEAR VERIFICATION: SIGNIFICANT CHANGE UP
MCHC RBC-ENTMCNC: 33.1 GM/DL — SIGNIFICANT CHANGE UP (ref 32–36)
MCHC RBC-ENTMCNC: 35 PG — HIGH (ref 27–34)
MCV RBC AUTO: 105.8 FL — HIGH (ref 80–100)
MONOCYTES # BLD AUTO: 0.35 K/UL — SIGNIFICANT CHANGE UP (ref 0–0.9)
MONOCYTES NFR BLD AUTO: 4.4 % — SIGNIFICANT CHANGE UP (ref 2–14)
NEUTROPHILS # BLD AUTO: 6.17 K/UL — SIGNIFICANT CHANGE UP (ref 1.8–7.4)
NEUTROPHILS NFR BLD AUTO: 77.2 % — HIGH (ref 43–77)
NRBC # BLD: 1 /100 — HIGH (ref 0–0)
PLAT MORPH BLD: NORMAL — SIGNIFICANT CHANGE UP
PLATELET # BLD AUTO: 199 K/UL — SIGNIFICANT CHANGE UP (ref 150–400)
POTASSIUM SERPL-MCNC: 3.6 MMOL/L — SIGNIFICANT CHANGE UP (ref 3.5–5.3)
POTASSIUM SERPL-SCNC: 3.6 MMOL/L — SIGNIFICANT CHANGE UP (ref 3.5–5.3)
RBC # BLD: 3.11 M/UL — LOW (ref 3.8–5.2)
RBC # FLD: 11.3 % — SIGNIFICANT CHANGE UP (ref 10.3–14.5)
RBC BLD AUTO: SIGNIFICANT CHANGE UP
SODIUM SERPL-SCNC: 137 MMOL/L — SIGNIFICANT CHANGE UP (ref 135–145)
WBC # BLD: 7.99 K/UL — SIGNIFICANT CHANGE UP (ref 3.8–10.5)
WBC # FLD AUTO: 7.99 K/UL — SIGNIFICANT CHANGE UP (ref 3.8–10.5)

## 2022-07-27 RX ORDER — KETOROLAC TROMETHAMINE 30 MG/ML
30 SYRINGE (ML) INJECTION EVERY 6 HOURS
Refills: 0 | Status: DISCONTINUED | OUTPATIENT
Start: 2022-07-27 | End: 2022-07-27

## 2022-07-27 RX ORDER — IBUPROFEN 200 MG
600 TABLET ORAL EVERY 6 HOURS
Refills: 0 | Status: DISCONTINUED | OUTPATIENT
Start: 2022-07-27 | End: 2022-07-28

## 2022-07-27 RX ADMIN — HEPARIN SODIUM 5000 UNIT(S): 5000 INJECTION INTRAVENOUS; SUBCUTANEOUS at 08:44

## 2022-07-27 RX ADMIN — OXYCODONE HYDROCHLORIDE 5 MILLIGRAM(S): 5 TABLET ORAL at 11:00

## 2022-07-27 RX ADMIN — Medication 600 MILLIGRAM(S): at 21:30

## 2022-07-27 RX ADMIN — Medication 975 MILLIGRAM(S): at 06:20

## 2022-07-27 RX ADMIN — OXYCODONE HYDROCHLORIDE 5 MILLIGRAM(S): 5 TABLET ORAL at 15:33

## 2022-07-27 RX ADMIN — SIMETHICONE 80 MILLIGRAM(S): 80 TABLET, CHEWABLE ORAL at 08:43

## 2022-07-27 RX ADMIN — Medication 30 MILLIGRAM(S): at 09:19

## 2022-07-27 RX ADMIN — SIMETHICONE 80 MILLIGRAM(S): 80 TABLET, CHEWABLE ORAL at 23:35

## 2022-07-27 RX ADMIN — Medication 30 MILLIGRAM(S): at 15:33

## 2022-07-27 RX ADMIN — OXYCODONE HYDROCHLORIDE 5 MILLIGRAM(S): 5 TABLET ORAL at 10:13

## 2022-07-27 RX ADMIN — Medication 975 MILLIGRAM(S): at 00:16

## 2022-07-27 RX ADMIN — Medication 600 MILLIGRAM(S): at 20:33

## 2022-07-27 RX ADMIN — HEPARIN SODIUM 5000 UNIT(S): 5000 INJECTION INTRAVENOUS; SUBCUTANEOUS at 20:33

## 2022-07-27 RX ADMIN — Medication 30 MILLIGRAM(S): at 16:00

## 2022-07-27 RX ADMIN — OXYCODONE HYDROCHLORIDE 5 MILLIGRAM(S): 5 TABLET ORAL at 16:00

## 2022-07-27 RX ADMIN — Medication 30 MILLIGRAM(S): at 08:49

## 2022-07-27 RX ADMIN — Medication 975 MILLIGRAM(S): at 12:08

## 2022-07-27 RX ADMIN — Medication 975 MILLIGRAM(S): at 05:38

## 2022-07-27 RX ADMIN — Medication 975 MILLIGRAM(S): at 12:40

## 2022-07-27 RX ADMIN — SIMETHICONE 80 MILLIGRAM(S): 80 TABLET, CHEWABLE ORAL at 15:33

## 2022-07-27 RX ADMIN — Medication 975 MILLIGRAM(S): at 23:35

## 2022-07-27 NOTE — DISCHARGE NOTE PROVIDER - CARE PROVIDER_API CALL
Deedee Baca)  Obstetrics and Gynecology  [address] [place] [address]  Phone: [phone]  Fax: [phone]  Follow Up Time: 2 weeks

## 2022-07-27 NOTE — DISCHARGE NOTE PROVIDER - HOSPITAL COURSE
38yo woman s/p abdominal myomectomy  Hct 38.3->     On POD#0, montejo was discontinued, and patient voided spontaneously.    Patient was discharged on POD#XXXXX with stable vitals, tolerating PO, voiding spontaneously, and ambulating. Patient to have close outpatient follow-up Dr. Baca.   38yo woman s/p scheduled abdominal myomectomy. Please see operative report for more details.      Hct 38.3->32.9->     On POD#1, montejo was discontinued, and patient voided spontaneously.   On POD#2 patient was tolerating PO, voiding spontaneously, ambulating and pain was controlled. Patient was discharged home today in stable condition with normal vital signs.  Patient to have close outpatient follow-up Dr. Baca.   38yo woman s/p scheduled abdominal myomectomy. Please see operative report for more details.        Hct 38.3->32.9->27.8->29.9     On POD#1, montejo was discontinued, and patient voided spontaneously. She tolerated a regular diet.  On POD#2 patient was tolerating PO, voiding spontaneously, ambulating and pain was controlled. Patient was discharged home today in stable condition with normal vital signs.  Patient to have close outpatient follow-up Dr. Baca.   36yo woman s/p scheduled abdominal myomectomy. Please see operative report for more details.        Hct 38.3->32.9->27.8->29.9     On POD#1, montejo was discontinued, and patient voided spontaneously. She tolerated a regular diet.  On POD#2 patient was tolerating PO, voiding spontaneously, ambulating and pain was controlled. Of note patient drop in hemoglobin is a result of blood loss anemia. Patient remained asymptomatic and hemodynamically stable. Patient was discharged home today in stable condition with normal vital signs.  Patient to have close outpatient follow-up Dr. Baca.

## 2022-07-27 NOTE — DISCHARGE NOTE PROVIDER - CARE PROVIDERS DIRECT ADDRESSES
[email or web address]

## 2022-07-27 NOTE — DISCHARGE NOTE PROVIDER - NSDCMRMEDTOKEN_GEN_ALL_CORE_FT
Co Q-10 100 mg oral capsule: 1 cap(s) orally once a day  levocetirizine 5 mg oral tablet: 1 tab(s) orally once a day (in the evening)  spironolactone 50 mg oral tablet: 1 tab(s) orally 2 times a day for acne  Vitamin C 100 mg oral tablet: 1 tab(s) orally once a day  Vitamin D3 400 intl units (10 mcg) oral tablet: 1 tab(s) orally once a day   Co Q-10 100 mg oral capsule: 1 cap(s) orally once a day  levocetirizine 5 mg oral tablet: 1 tab(s) orally once a day (in the evening)  oxyCODONE 5 mg oral tablet: 1 tab(s) orally every 6 hours MDD:20mg  senna (sennosides) 15 mg oral tablet: 1 tab(s) orally once a day   spironolactone 50 mg oral tablet: 1 tab(s) orally 2 times a day for acne  Vitamin C 100 mg oral tablet: 1 tab(s) orally once a day  Vitamin D3 400 intl units (10 mcg) oral tablet: 1 tab(s) orally once a day

## 2022-07-27 NOTE — PROGRESS NOTE ADULT - ASSESSMENT
Assessment/Plan: 37y female POD#1 HD#2 , s/p Abdominal Myomectomy. Patient is doing well postoperatively and VSS.     CV: Hemodynamically stable  Pulm: Saturating well on RA. Increase incentive spirometry.  GI: Advance diet as tolerated  : Voiding spontaneously. Adequate UOP  Heme: Continue HSQ, Venodynes for DVT ppx. Increase OOB.    Neuro: Continue oral meds for pain control.  FEN: LR@125  - Replete electrolytes PRN  Dispo: D/C pending     Jenna Katz PGY1         Assessment/Plan: 37y female POD#0 HD#1 , s/p Abdominal Myomectomy. Patient is doing well postoperatively and VSS.     CV: Hemodynamically stable  Pulm: Saturating well on RA. Increase incentive spirometry.  GI: Advance diet as tolerated  : Voiding spontaneously. Adequate UOP  Heme: Continue HSQ, Venodynes for DVT ppx. Increase OOB.    Neuro: Continue oral meds for pain control.  FEN: LR@125  - Replete electrolytes PRN  Dispo: D/C pending     Jenna Katz PGY1         Assessment/Plan: 37y female POD#0 HD#1 , s/p Abdominal Myomectomy. Patient is doing well postoperatively and VSS.     CV: Hemodynamically stable  Pulm: Saturating well on RA. Increase incentive spirometry.  GI: On reg diet   : Voiding spontaneously. Adequate UOP  Heme: Continue HSQ, Venodynes for DVT ppx. Increase OOB.    Neuro: Continue oral meds for pain control.  FEN: SLIV   Dispo: routine post-op care  Jenna Katz PGY1

## 2022-07-27 NOTE — DISCHARGE NOTE PROVIDER - NSDCFUADDINST_GEN_ALL_CORE_FT
Regular diet as tolerated, regular activity as tolerated, no heavy lifting for six weeks.  Nothing per vagina: no intercourse, tampons or douching.  Call your provider if you experience fevers, chills, worsening abdominal pain, inability to urinate or worsening vaginal bleeding.  Follow up with your provider in 2 weeks. Regular diet as tolerated, regular activity as tolerated, no heavy lifting for six weeks.  Nothing per vagina: no intercourse, tampons or douching.  Call your provider if you experience fevers, chills, worsening abdominal pain, inability to urinate or worsening vaginal bleeding. Alternate Tylenol and Motrin for pain. For severe pain take oxycodone along with senna.  Follow up with your provider in 2 weeks.

## 2022-07-27 NOTE — DISCHARGE NOTE PROVIDER - NSDCACTIVITY_GEN_ALL_CORE
Showering allowed/Driving allowed/Walking - Indoors allowed/Walking - Outdoors allowed Showering allowed/Stairs allowed/Driving allowed/Walking - Indoors allowed/Walking - Outdoors allowed

## 2022-07-27 NOTE — PROVIDER CONTACT NOTE (OTHER) - ACTION/TREATMENT ORDERED:
Dr. Gill to order incentive spirometer. Pt given pitcher of water to drink. Will continue to monitor

## 2022-07-27 NOTE — DISCHARGE NOTE PROVIDER - NSDCFUSCHEDAPPT_GEN_ALL_CORE_FT
Deedee Baca  [facility] Physician [facility]  [facility] [address]  Scheduled Appointment: 08/12/2022

## 2022-07-27 NOTE — PROGRESS NOTE ADULT - SUBJECTIVE AND OBJECTIVE BOX
Gyn Progress Note POD#1 HD#2    Subjective:   Pt seen and examined at bedside. No events overnight. Pain well controlled on oral analgesia. Patient ambulating. Passing flatus. Tolerating regular diet. Pt denies fever, chills, chest pain, SOB, nausea, vomiting, lightheadedness, dizziness.      Objective:  T(F): 98.4 (07-27-22 @ 05:33), Max: 98.9 (07-26-22 @ 20:05)  HR: 86 (07-27-22 @ 05:33) (71 - 86)  BP: 97/61 (07-27-22 @ 05:33) (92/51 - 120/69)  RR: 18 (07-27-22 @ 05:33) (16 - 18)  SpO2: 98% (07-27-22 @ 05:33) (94% - 100%)  Wt(kg): --  I&O's Summary    26 Jul 2022 07:01  -  27 Jul 2022 06:49  --------------------------------------------------------  IN: 2055 mL / OUT: 975 mL / NET: 1080 mL    MEDICATIONS  (STANDING):  acetaminophen     Tablet .. 975 milliGRAM(s) Oral every 6 hours  cefoTEtan  IVPB 2 Gram(s) IV Intermittent once  heparin   Injectable 5000 Unit(s) SubCutaneous every 12 hours  lactated ringers. 1000 milliLiter(s) (125 mL/Hr) IV Continuous <Continuous>  simethicone 80 milliGRAM(s) Chew every 8 hours    MEDICATIONS  (PRN):  ketorolac   Injectable 15 milliGRAM(s) IV Push every 6 hours PRN Severe Pain (7 - 10)  ondansetron Injectable 4 milliGRAM(s) IV Push every 6 hours PRN Nausea and/or Vomiting  oxyCODONE    IR 5 milliGRAM(s) Oral every 4 hours PRN Severe Pain (7 - 10)      Physical Exam:  Constitutional: NAD, A+O x3  CV: Nml S1,S2, RRR  Lungs: clear to auscultation bilaterally  Abdomen: soft, nondistended, no guarding, no rebound, normal bowel sounds  Incision: 1 low transverse incision clean, dry, intact   Extremities: no lower extremity edema or calf tenderness bilaterally; venodynes in place Gyn Progress Note POD#0 HD#1    Subjective:   Pt seen and examined at bedside. No events overnight. Pain well controlled on oral analgesia. Patient ambulating. Passing flatus. Tolerating regular diet. Pt denies fever, chills, chest pain, SOB, nausea, vomiting, lightheadedness, dizziness.      Objective:  T(F): 98.4 (07-27-22 @ 05:33), Max: 98.9 (07-26-22 @ 20:05)  HR: 86 (07-27-22 @ 05:33) (71 - 86)  BP: 97/61 (07-27-22 @ 05:33) (92/51 - 120/69)  RR: 18 (07-27-22 @ 05:33) (16 - 18)  SpO2: 98% (07-27-22 @ 05:33) (94% - 100%)  Wt(kg): --  I&O's Summary    26 Jul 2022 07:01  -  27 Jul 2022 06:49  --------------------------------------------------------  IN: 2055 mL / OUT: 975 mL / NET: 1080 mL    MEDICATIONS  (STANDING):  acetaminophen     Tablet .. 975 milliGRAM(s) Oral every 6 hours  cefoTEtan  IVPB 2 Gram(s) IV Intermittent once  heparin   Injectable 5000 Unit(s) SubCutaneous every 12 hours  lactated ringers. 1000 milliLiter(s) (125 mL/Hr) IV Continuous <Continuous>  simethicone 80 milliGRAM(s) Chew every 8 hours    MEDICATIONS  (PRN):  ketorolac   Injectable 15 milliGRAM(s) IV Push every 6 hours PRN Severe Pain (7 - 10)  ondansetron Injectable 4 milliGRAM(s) IV Push every 6 hours PRN Nausea and/or Vomiting  oxyCODONE    IR 5 milliGRAM(s) Oral every 4 hours PRN Severe Pain (7 - 10)      Physical Exam:  Constitutional: NAD, A+O x3  CV: Nml S1,S2, RRR  Lungs: clear to auscultation bilaterally  Abdomen: soft, nondistended, no guarding, no rebound, normal bowel sounds  Incision: 1 low transverse incision clean, dry, intact   Extremities: no lower extremity edema or calf tenderness bilaterally; venodynes in place

## 2022-07-27 NOTE — PROVIDER CONTACT NOTE (OTHER) - ASSESSMENT
VS WNL. Pt has a slight cough. IV fluids given, IV recently infiltrated and new IV started with LR @ 125.

## 2022-07-27 NOTE — PROGRESS NOTE ADULT - ATTENDING COMMENTS
Ms. Phan is now POD#1 s/p abd myomectomy for fibroid uterus (EBL=250). No acute overnight events. VSS. Pt tolerating reg diet. Minimal flatus passed. Voiding spontaneously. +Ambulating to the bathroom. She is having some incisional pain this morning but overall pain tolerable with current regiment. Moderate VB, using 2 pads yesterday. Abd is soft, appropriately tender. ND. Pfannenstiel incision is c/d/i with steristrip in place. AM H/H reviewed  10.9/32.9. Pt doing well. Will continue routine post-op care. C/w HSQ for DVT prophylaxis. Will continue with IV toradol for pain control. Will transition to po pain meds later today after PM eval for pain management.     D/w Dr. Keven Strong, pgy-6 Ms. Phan is now POD#1 s/p abd myomectomy for fibroid uterus (EBL=250). No acute overnight events. VSS. Pt tolerating reg diet. Minimal flatus passed. Voiding spontaneously. +Ambulating to the bathroom. She is having some incisional pain this morning but overall pain tolerable with current regiment. Moderate VB, using 2 pads yesterday. Abd is soft, appropriately tender. ND. Pfannenstiel incision is c/d/i with steristrip in place. AM H/H reviewed  10.9/32.9. Pt doing well. Will continue routine post-op care. C/w HSQ for DVT prophylaxis. Will continue with IV toradol for pain control. Will transition to po pain meds later today after PM eval for pain management.     D/w Dr. Baca  [facility], pgy-6      GYN Attending Note  pt seen and examined. agree with above. will continue routine postop care. eval for dc home tomorrow.    lionel baca

## 2022-07-28 ENCOUNTER — TRANSCRIPTION ENCOUNTER (OUTPATIENT)
Age: 37
End: 2022-07-28

## 2022-07-28 VITALS
RESPIRATION RATE: 18 BRPM | HEART RATE: 80 BPM | DIASTOLIC BLOOD PRESSURE: 69 MMHG | OXYGEN SATURATION: 99 % | TEMPERATURE: 98 F | SYSTOLIC BLOOD PRESSURE: 107 MMHG

## 2022-07-28 DIAGNOSIS — Z98.890 OTHER SPECIFIED POSTPROCEDURAL STATES: ICD-10-CM

## 2022-07-28 LAB
HCT VFR BLD CALC: 27.8 % — LOW (ref 34.5–45)
HCT VFR BLD CALC: 29.9 % — LOW (ref 34.5–45)
HGB BLD-MCNC: 9.3 G/DL — LOW (ref 11.5–15.5)
HGB BLD-MCNC: 9.7 G/DL — LOW (ref 11.5–15.5)
MCHC RBC-ENTMCNC: 32.4 GM/DL — SIGNIFICANT CHANGE UP (ref 32–36)
MCHC RBC-ENTMCNC: 33.5 GM/DL — SIGNIFICANT CHANGE UP (ref 32–36)
MCHC RBC-ENTMCNC: 34.4 PG — HIGH (ref 27–34)
MCHC RBC-ENTMCNC: 35.1 PG — HIGH (ref 27–34)
MCV RBC AUTO: 104.9 FL — HIGH (ref 80–100)
MCV RBC AUTO: 106 FL — HIGH (ref 80–100)
NRBC # BLD: 0 /100 WBCS — SIGNIFICANT CHANGE UP (ref 0–0)
NRBC # BLD: 0 /100 WBCS — SIGNIFICANT CHANGE UP (ref 0–0)
PLATELET # BLD AUTO: 171 K/UL — SIGNIFICANT CHANGE UP (ref 150–400)
PLATELET # BLD AUTO: 177 K/UL — SIGNIFICANT CHANGE UP (ref 150–400)
RBC # BLD: 2.65 M/UL — LOW (ref 3.8–5.2)
RBC # BLD: 2.82 M/UL — LOW (ref 3.8–5.2)
RBC # FLD: 11.3 % — SIGNIFICANT CHANGE UP (ref 10.3–14.5)
RBC # FLD: 11.4 % — SIGNIFICANT CHANGE UP (ref 10.3–14.5)
WBC # BLD: 5.37 K/UL — SIGNIFICANT CHANGE UP (ref 3.8–10.5)
WBC # BLD: 5.97 K/UL — SIGNIFICANT CHANGE UP (ref 3.8–10.5)
WBC # FLD AUTO: 5.37 K/UL — SIGNIFICANT CHANGE UP (ref 3.8–10.5)
WBC # FLD AUTO: 5.97 K/UL — SIGNIFICANT CHANGE UP (ref 3.8–10.5)

## 2022-07-28 PROCEDURE — 80048 BASIC METABOLIC PNL TOTAL CA: CPT

## 2022-07-28 PROCEDURE — 81025 URINE PREGNANCY TEST: CPT

## 2022-07-28 PROCEDURE — 88305 TISSUE EXAM BY PATHOLOGIST: CPT

## 2022-07-28 PROCEDURE — C1889: CPT

## 2022-07-28 PROCEDURE — 85025 COMPLETE CBC W/AUTO DIFF WBC: CPT

## 2022-07-28 PROCEDURE — 85027 COMPLETE CBC AUTOMATED: CPT

## 2022-07-28 PROCEDURE — 36415 COLL VENOUS BLD VENIPUNCTURE: CPT

## 2022-07-28 PROCEDURE — C1765: CPT

## 2022-07-28 PROCEDURE — C9399: CPT

## 2022-07-28 RX ORDER — OXYCODONE HYDROCHLORIDE 5 MG/1
1 TABLET ORAL
Qty: 12 | Refills: 0
Start: 2022-07-28

## 2022-07-28 RX ORDER — OXYCODONE HYDROCHLORIDE 5 MG/1
1 TABLET ORAL
Qty: 6 | Refills: 0
Start: 2022-07-28

## 2022-07-28 RX ORDER — SENNA PLUS 8.6 MG/1
1 TABLET ORAL
Qty: 5 | Refills: 0
Start: 2022-07-28 | End: 2022-08-01

## 2022-07-28 RX ADMIN — HEPARIN SODIUM 5000 UNIT(S): 5000 INJECTION INTRAVENOUS; SUBCUTANEOUS at 08:52

## 2022-07-28 RX ADMIN — Medication 975 MILLIGRAM(S): at 05:32

## 2022-07-28 RX ADMIN — Medication 600 MILLIGRAM(S): at 04:00

## 2022-07-28 RX ADMIN — SIMETHICONE 80 MILLIGRAM(S): 80 TABLET, CHEWABLE ORAL at 07:58

## 2022-07-28 RX ADMIN — Medication 975 MILLIGRAM(S): at 11:49

## 2022-07-28 RX ADMIN — Medication 600 MILLIGRAM(S): at 02:58

## 2022-07-28 RX ADMIN — Medication 975 MILLIGRAM(S): at 12:47

## 2022-07-28 RX ADMIN — Medication 600 MILLIGRAM(S): at 09:50

## 2022-07-28 RX ADMIN — Medication 600 MILLIGRAM(S): at 08:52

## 2022-07-28 RX ADMIN — Medication 975 MILLIGRAM(S): at 00:30

## 2022-07-28 NOTE — PROGRESS NOTE ADULT - ATTENDING COMMENTS
pt seen and examined. agree with above. rpt cbc stable. pt stable to dc home. to f/u in office in two weeks at [facility] lynn olsen

## 2022-07-28 NOTE — DISCHARGE NOTE NURSING/CASE MANAGEMENT/SOCIAL WORK - PATIENT PORTAL LINK FT
You can access the FollowMyHealth Patient Portal offered by [facility] by registering at the following website: [email or web address]. By joining [employer]’s FollowMyHealth portal, you will also be able to view your health information using other applications (apps) compatible with our system.

## 2022-07-28 NOTE — PROGRESS NOTE ADULT - SUBJECTIVE AND OBJECTIVE BOX
Gyn Progress Note POD#2 HD#3    Subjective:   Pt seen and examined at bedside. No events overnight. Pain well controlled. Patient ambulating. Passing some flatus. Tolerating regular diet. Pt denies fever, chills, chest pain, SOB, nausea, vomiting, lightheadedness, dizziness.      Objective:  T(F): 98.2 (07-28-22 @ 05:27), Max: 98.6 (07-27-22 @ 21:00)  HR: 81 (07-28-22 @ 05:27) (74 - 90)  BP: 106/67 (07-28-22 @ 05:27) (96/59 - 114/73)  RR: 18 (07-28-22 @ 05:27) (18 - 18)  SpO2: 98% (07-28-22 @ 05:27) (97% - 100%)  Wt(kg): --  I&O's Summary    26 Jul 2022 07:01  -  27 Jul 2022 07:00  --------------------------------------------------------  IN: 2055 mL / OUT: 975 mL / NET: 1080 mL    27 Jul 2022 07:01  -  28 Jul 2022 06:18  --------------------------------------------------------  IN: 0 mL / OUT: 850 mL / NET: -850 mL      CAPILLARY BLOOD GLUCOSE          MEDICATIONS  (STANDING):  acetaminophen     Tablet .. 975 milliGRAM(s) Oral every 6 hours  heparin   Injectable 5000 Unit(s) SubCutaneous every 12 hours  ibuprofen  Tablet. 600 milliGRAM(s) Oral every 6 hours  simethicone 80 milliGRAM(s) Chew every 8 hours    MEDICATIONS  (PRN):  ondansetron Injectable 4 milliGRAM(s) IV Push every 6 hours PRN Nausea and/or Vomiting  oxyCODONE    IR 5 milliGRAM(s) Oral every 4 hours PRN Severe Pain (7 - 10)      Physical Exam:  Constitutional: NAD, A+O x3  CV: RRR  Lungs: clear to auscultation bilaterally  Abdomen: soft, mildly distended, no guarding, no rebound, normal bowel sounds  Incision: low transverse incision clean, dry, intact  Extremities: no lower extremity edema or calf tenderness bilaterally; venodynes in place    LABS:            10.9      7.99 )------------( 199        ( 07-27-22 @ 07:11 )            32.9    07-27    137    |  102    |  8      ----------------------------<  103<H>  3.6     |  28     |  0.77     Ca    8.0<L>      27 Jul 2022 07:17 Gyn Progress Note POD#2 HD#3    Subjective:   Pt seen and examined at bedside. No events overnight. Pain well controlled. Patient ambulating. Passing some flatus. Tolerating regular diet. Pt denies fever, chills, chest pain, SOB, nausea, vomiting, lightheadedness, dizziness.      Objective:  T(F): 98.2 (07-28-22 @ 05:27), Max: 98.6 (07-27-22 @ 21:00)  HR: 81 (07-28-22 @ 05:27) (74 - 90)  BP: 106/67 (07-28-22 @ 05:27) (96/59 - 114/73)  RR: 18 (07-28-22 @ 05:27) (18 - 18)  SpO2: 98% (07-28-22 @ 05:27) (97% - 100%)  Wt(kg): --  I&O's Summary    26 Jul 2022 07:01  -  27 Jul 2022 07:00  --------------------------------------------------------  IN: 2055 mL / OUT: 975 mL / NET: 1080 mL    27 Jul 2022 07:01  -  28 Jul 2022 06:18  --------------------------------------------------------  IN: 0 mL / OUT: 850 mL / NET: -850 mL    MEDICATIONS  (STANDING):  acetaminophen     Tablet .. 975 milliGRAM(s) Oral every 6 hours  heparin   Injectable 5000 Unit(s) SubCutaneous every 12 hours  ibuprofen  Tablet. 600 milliGRAM(s) Oral every 6 hours  simethicone 80 milliGRAM(s) Chew every 8 hours    MEDICATIONS  (PRN):  ondansetron Injectable 4 milliGRAM(s) IV Push every 6 hours PRN Nausea and/or Vomiting  oxyCODONE    IR 5 milliGRAM(s) Oral every 4 hours PRN Severe Pain (7 - 10)      Physical Exam:  Constitutional: NAD, A+O x3  CV: RRR  Lungs: clear to auscultation bilaterally  Abdomen: soft, mildly distended, TTP in lower quadrants, no guarding, no rebound, normal bowel sounds  Incision: low transverse incision clean, dry, intact  Extremities: no lower extremity edema or calf tenderness bilaterally    LABS:            10.9      7.99 )------------( 199        ( 07-27-22 @ 07:11 )            32.9    07-27    137    |  102    |  8      ----------------------------<  103<H>  3.6     |  28     |  0.77     Ca    8.0<L>      27 Jul 2022 07:17

## 2022-07-28 NOTE — PROGRESS NOTE ADULT - PROBLEM SELECTOR PLAN 1
CV: Hemodynamically stable  -f/u AM CBC  Pulm: Saturating well on RA. Increase incentive spirometry.  GI: Regular diet. Simethicone for gas. Zofran PRN for nausea  : Voiding spontaneously. Adequate UOP  Heme: Continue HSQ, Venodynes for DVT ppx. Increase OOB.    Neuro: Continue Tylenol, Motrin, Oxycodone for pain control.  FEN: SLIV   Dispo: discharge planning    Lauryn Phillips PGY1 CV: Hemodynamically stable  -f/u AM CBC  Pulm: Saturating well on RA. Increase incentive spirometry.  GI: Regular diet. Simethicone for gas. Zofran PRN for nausea  : Voiding spontaneously. Adequate UOP  Heme: Continue HSQ, Venodynes for DVT ppx. Increase OOB.    Neuro: Continue Tylenol, Motrin, Oxycodone for pain control.  FEN: SLIV   Dispo: discharge planning    Lauryn Phillips, PGY1    MIGS FELLOW NOTE    Ms. Phan is POD#2 s/p abd myomectomy for fibroid uterus (EBL=250). VSS. No acute overnight events. Pt tolerating reg diet and passing flatus. Voiding spontaneously. Abd is soft, slight abd distention from yesterday, appropriately tender. Incision is c/d/i. Minimal VB. Pain is well controlled. She is ambulating in the hallways this morning without difficulty. Discharge precautions reviewed with patient. Anticipate discharge later today.     Tae, pgy-6/FMIGS-2

## 2022-07-28 NOTE — PROGRESS NOTE ADULT - ASSESSMENT
Assessment/Plan: 37y female POD#2 HD#3, s/p Abdominal Myomectomy. Mild abdominal distention likely due to gas. Patient is doing well postoperatively and VSS.

## 2022-07-29 LAB — SURGICAL PATHOLOGY STUDY: SIGNIFICANT CHANGE UP

## 2022-08-12 ENCOUNTER — APPOINTMENT (OUTPATIENT)
Dept: OBGYN | Facility: CLINIC | Age: 37
End: 2022-08-12

## 2022-08-12 VITALS
DIASTOLIC BLOOD PRESSURE: 72 MMHG | HEIGHT: 66 IN | BODY MASS INDEX: 25.39 KG/M2 | SYSTOLIC BLOOD PRESSURE: 105 MMHG | HEART RATE: 72 BPM | WEIGHT: 158 LBS

## 2022-08-12 DIAGNOSIS — D25.9 LEIOMYOMA OF UTERUS, UNSPECIFIED: ICD-10-CM

## 2022-08-12 PROCEDURE — 99024 POSTOP FOLLOW-UP VISIT: CPT

## 2022-08-16 PROBLEM — D25.9 FIBROID UTERUS: Status: ACTIVE | Noted: 2022-03-25

## 2022-08-16 NOTE — HISTORY OF PRESENT ILLNESS
[Pain is well-controlled] : pain is well-controlled [Clean/Dry/Intact] : clean, dry and intact [Erythema] : not erythematous [Swelling] : not swollen

## 2022-08-16 NOTE — PLAN
[FreeTextEntry1] : pt doing well today\par reviewed activity restrictions\par to have pelvic sono in wix months

## 2022-08-16 NOTE — END OF VISIT
[FreeTextEntry3] : I, Lauren Romero, acted as a scribe on behalf of Dr. Deedee Baca on 08/12/2022.\par \par All medical entries made by the scribe were at my, Dr. Deedee Baca's, direction and personally dictated by me on 08/12/2022. I have reviewed the chart and agree that the record accurately reflects my personal performance of the history, physical exam, assessment and plan. I have also personally directed, reviewed, and agreed with the chart.

## 2022-10-22 NOTE — PRE-ANESTHESIA EVALUATION ADULT - NSANTHNECKRD_ENT_A_CORE
Right sided testicular pain with palpation.  Thinks he may have hurt it getting off creeper at work.  Today painful and swollen   
No

## 2022-11-03 ENCOUNTER — APPOINTMENT (OUTPATIENT)
Dept: HEART AND VASCULAR | Facility: CLINIC | Age: 37
End: 2022-11-03

## 2022-11-03 ENCOUNTER — NON-APPOINTMENT (OUTPATIENT)
Age: 37
End: 2022-11-03

## 2022-11-03 VITALS
TEMPERATURE: 97.6 F | HEART RATE: 89 BPM | HEIGHT: 66 IN | DIASTOLIC BLOOD PRESSURE: 80 MMHG | OXYGEN SATURATION: 96 % | WEIGHT: 157 LBS | SYSTOLIC BLOOD PRESSURE: 122 MMHG | BODY MASS INDEX: 25.23 KG/M2

## 2022-11-03 DIAGNOSIS — R07.89 OTHER CHEST PAIN: ICD-10-CM

## 2022-11-03 PROCEDURE — 99203 OFFICE O/P NEW LOW 30 MIN: CPT | Mod: 25

## 2022-11-03 PROCEDURE — 93000 ELECTROCARDIOGRAM COMPLETE: CPT

## 2022-11-03 PROCEDURE — 93306 TTE W/DOPPLER COMPLETE: CPT

## 2022-11-06 PROBLEM — R07.89 CHEST PRESSURE: Status: ACTIVE | Noted: 2022-11-03

## 2022-11-06 NOTE — HISTORY OF PRESENT ILLNESS
[FreeTextEntry1] : the patient feels like something is sitting on her chest.  She has been under stress.  This has been occurring for 2 years.  She had this as far back as high school.  The discomfort improves with exercise.  She does strength training and treadmill with an incline of 15%.  Her chest discomfort can last all day.  She has 6 alcoholic drinks per week and no caffeine.

## 2022-11-06 NOTE — DISCUSSION/SUMMARY
[FreeTextEntry1] : The patient has had atypical chest discomfort.  She is physically active without difficulty.  The EKG is normal.  The echocardiogram shows an ejection fraction of 60% without significant valvular disease.  The patient has noncardiac chest pain.  She will send us her cholesterol reading.  No cardiac medication is necessary. [EKG obtained to assist in diagnosis and management of assessed problem(s)] : EKG obtained to assist in diagnosis and management of assessed problem(s)

## 2022-11-14 DIAGNOSIS — Z30.40 ENCOUNTER FOR SURVEILLANCE OF CONTRACEPTIVES, UNSPECIFIED: ICD-10-CM

## 2022-12-19 ENCOUNTER — APPOINTMENT (OUTPATIENT)
Dept: HEART AND VASCULAR | Facility: CLINIC | Age: 37
End: 2022-12-19

## 2022-12-29 ENCOUNTER — RESULT REVIEW (OUTPATIENT)
Age: 37
End: 2022-12-29

## 2022-12-29 ENCOUNTER — APPOINTMENT (OUTPATIENT)
Dept: ULTRASOUND IMAGING | Facility: CLINIC | Age: 37
End: 2022-12-29
Payer: COMMERCIAL

## 2022-12-29 ENCOUNTER — OUTPATIENT (OUTPATIENT)
Dept: OUTPATIENT SERVICES | Facility: HOSPITAL | Age: 37
LOS: 1 days | End: 2022-12-29

## 2022-12-29 DIAGNOSIS — Z98.89 OTHER SPECIFIED POSTPROCEDURAL STATES: Chronic | ICD-10-CM

## 2022-12-29 DIAGNOSIS — Z98.890 OTHER SPECIFIED POSTPROCEDURAL STATES: Chronic | ICD-10-CM

## 2022-12-29 DIAGNOSIS — Z90.13 ACQUIRED ABSENCE OF BILATERAL BREASTS AND NIPPLES: Chronic | ICD-10-CM

## 2022-12-29 PROCEDURE — 76856 US EXAM PELVIC COMPLETE: CPT | Mod: 26,59

## 2022-12-29 PROCEDURE — 76830 TRANSVAGINAL US NON-OB: CPT | Mod: 26

## 2023-01-04 ENCOUNTER — RESULT REVIEW (OUTPATIENT)
Age: 38
End: 2023-01-04

## 2023-01-04 ENCOUNTER — APPOINTMENT (OUTPATIENT)
Dept: GYNECOLOGIC ONCOLOGY | Facility: CLINIC | Age: 38
End: 2023-01-04
Payer: COMMERCIAL

## 2023-01-04 VITALS
SYSTOLIC BLOOD PRESSURE: 112 MMHG | DIASTOLIC BLOOD PRESSURE: 74 MMHG | BODY MASS INDEX: 25.23 KG/M2 | WEIGHT: 157 LBS | HEIGHT: 66 IN

## 2023-01-04 PROCEDURE — 99214 OFFICE O/P EST MOD 30 MIN: CPT

## 2023-01-05 LAB — CANCER AG125 SERPL-ACNC: 11 U/ML

## 2023-01-20 NOTE — ASSESSMENT
[FreeTextEntry1] : Future risk-reducing bilateral salpingo-oophorectomy (rrBSO) is indicated as she is at the highest risk of epithelial ovarian and fallopian tube cancer with a lifetime risk of ovarian cancer ranging from 35 to 46 percent in women with BRCA1 gene mutations. I explained that it is the most effective approach to reduce the risk of ovarian and fallopian tube cancer. She was counseled that a residual approximate 2% risk for peritoneal carcinoma remains following surgery. As well, I explained that final pathology after surgery can reveal an early diagnosis of cancer that would then warrant a subsequent staging surgery.\par \par Plan for future postoperative ERT until age 51 to ameliorate surgical menopause with plan to taper & discontinue at that time. Two recent studies on women with BRCA mutations did not demonstrate increased risk of breast cancer in women who have undergone risk reduction BSO and HRT postoperatively. ERT appeared to have less risk than estrogen/progesterone combination therapy [David Javed al, ELISABETH Oncology 2018 Au [phone]. ANDRADE Monsalve et al Crit Rev Oncol Hematol. 2018 Dec;132:111-115].\par \par Plan for surgery on or about age 41. Return in 1 year for interval f/u. We also discussed consideration of embryo transfer with considering future children. Repeat US in 6 months. Pt should push out her next appointment with Dr. Baca. I reviewed colposcopy results from March which are not overall concerning in light of low-risk findings, negative HR HPV, & given pt is not currently sexually active.

## 2023-01-20 NOTE — HISTORY OF PRESENT ILLNESS
[FreeTextEntry1] : 36 y/o patient with BRCA 1(+) mutation, anticipating future risk reducing BSO. Patient completed oocyte retrieval with Dr. Goss at [facility]. She is now s/p abdominal myomectomy with Dr. Baca in July/2022. Patient presents today to discuss rrBSO.  Today she denies persistent abdominal bloating, abdominopelvic pain. She reports normal bowel/bladder functions.  Her menstrual began today. Patient is seeking rrBSO closer to her 41th birthday.  Status post abdominal myomectomy with Dr. Baca in July.\par \par Pelvic/TV US (12/29/22)\par Since March 1, 2022, decrease in the size of the uterus due to decreased multiple fibroids.\par Normal ovaries.\par \par  (7/6/22) - 13\par \par \par She follows with Dr. Deedee Baca/GYN last seen on 3/25/22. She has an appointment in March.\par Pap smear (2/4/22) - LSIL, HR HPV negative\par \par Colposopy (3/25/22) - mild acetowhite changes at 3 and 11 oclock noted\par 1 . Cervix, 3 oclock, biopsy:\par - Focal Low grade cervical intraepithelial neoplasia grade 1 (mild dysplasia/LYUDMILA-1)\par \par 2. Cervix, 11 oclock, biopsy:\par - Low grade cervical intraepithelial neoplasia grade 1 (mild dysplasia/LYUDMILA-1)\par \par 3. ECC:\par - Fragments of squamous and endocervical epithelium\par - Negative for dysplasia\par \par \par Patient recently returned from a  trip.

## 2023-01-20 NOTE — PHYSICAL EXAM
[Normal] : Bimanual Exam: Normal [FreeTextEntry1] : Marisabel Adams MA was present the entire time of gynecological exam.

## 2023-01-20 NOTE — REASON FOR VISIT
[FreeTextEntry1] : [place] Location\par \par [facility] [phone] at [address] [place] [id number]\par \par [facility] Genetic testing [8/2015]: BRCA 1 (+) mutation surveillance

## 2023-02-08 ENCOUNTER — TRANSCRIPTION ENCOUNTER (OUTPATIENT)
Age: 38
End: 2023-02-08

## 2023-02-28 ENCOUNTER — NON-APPOINTMENT (OUTPATIENT)
Age: 38
End: 2023-02-28

## 2023-03-24 ENCOUNTER — APPOINTMENT (OUTPATIENT)
Dept: OBGYN | Facility: CLINIC | Age: 38
End: 2023-03-24
Payer: COMMERCIAL

## 2023-03-24 VITALS
HEART RATE: 120 BPM | SYSTOLIC BLOOD PRESSURE: 122 MMHG | BODY MASS INDEX: 24.59 KG/M2 | OXYGEN SATURATION: 96 % | HEIGHT: 66 IN | DIASTOLIC BLOOD PRESSURE: 82 MMHG | WEIGHT: 153 LBS

## 2023-03-24 PROCEDURE — 99395 PREV VISIT EST AGE 18-39: CPT

## 2023-03-27 LAB
C TRACH RRNA SPEC QL NAA+PROBE: NOT DETECTED
HPV HIGH+LOW RISK DNA PNL CVX: NOT DETECTED
N GONORRHOEA RRNA SPEC QL NAA+PROBE: NOT DETECTED
SOURCE TP AMPLIFICATION: NORMAL

## 2023-03-27 NOTE — PLAN
[FreeTextEntry1] : discussed pregnancy prior to 41 if able\par discussed ama pregnancy\par pap/hpv\par gc/ct

## 2023-03-27 NOTE — HISTORY OF PRESENT ILLNESS
[FreeTextEntry1] : presents for annual exam. has started her ivf cycles. osvaldo collins recently did retrieval. most recently had 3 retrieved. 2 mature and one immature. in november had 1 mature egg. total is currently 34 eggs. she is aiming for 40 eggs.\par \par \par

## 2023-05-08 ENCOUNTER — APPOINTMENT (OUTPATIENT)
Dept: OBGYN | Facility: CLINIC | Age: 38
End: 2023-05-08
Payer: COMMERCIAL

## 2023-05-08 VITALS
DIASTOLIC BLOOD PRESSURE: 78 MMHG | SYSTOLIC BLOOD PRESSURE: 112 MMHG | BODY MASS INDEX: 24.91 KG/M2 | WEIGHT: 155 LBS | HEART RATE: 82 BPM | OXYGEN SATURATION: 100 % | HEIGHT: 66 IN

## 2023-05-08 PROCEDURE — 57454 BX/CURETT OF CERVIX W/SCOPE: CPT

## 2023-05-08 NOTE — PLAN
[FreeTextEntry1] : 37 yo female pt present for a colposcopy\par \par -f/u pending pathology \par -pt to look into if ever has pos HPV, none documented

## 2023-05-08 NOTE — PROCEDURE
[Colposcopy] : Colposcopy  [Time out performed] : Pre-procedure time out performed.  Patient's name, date of birth and procedure confirmed. [Consent Obtained] : Consent obtained [Risks] : risks [Benefits] : benefits [Alternatives] : alternatives [Patient] : patient [Infection] : infection [Bleeding] : bleeding [Allergic Reaction] : allergic reaction [Hemostasis Obtained] : Hemostasis obtained [Tolerated Well] : the patient tolerated the procedure well [Colposcopy Adequate] : colposcopy adequate [No Abnormalities] : no abnormalities [Biopsy] : biopsy taken [de-identified] : 2 [de-identified] : ECC and 10:30  [de-identified] : negative for dysplasia

## 2023-06-29 ENCOUNTER — APPOINTMENT (OUTPATIENT)
Dept: ULTRASOUND IMAGING | Facility: CLINIC | Age: 38
End: 2023-06-29
Payer: COMMERCIAL

## 2023-06-29 ENCOUNTER — OUTPATIENT (OUTPATIENT)
Dept: OUTPATIENT SERVICES | Facility: HOSPITAL | Age: 38
LOS: 1 days | End: 2023-06-29

## 2023-06-29 DIAGNOSIS — Z98.89 OTHER SPECIFIED POSTPROCEDURAL STATES: Chronic | ICD-10-CM

## 2023-06-29 DIAGNOSIS — Z90.13 ACQUIRED ABSENCE OF BILATERAL BREASTS AND NIPPLES: Chronic | ICD-10-CM

## 2023-06-29 DIAGNOSIS — Z98.890 OTHER SPECIFIED POSTPROCEDURAL STATES: Chronic | ICD-10-CM

## 2023-06-29 PROCEDURE — 76856 US EXAM PELVIC COMPLETE: CPT | Mod: 26,59

## 2023-06-29 PROCEDURE — 76830 TRANSVAGINAL US NON-OB: CPT | Mod: 26

## 2023-07-07 DIAGNOSIS — D25.9 LEIOMYOMA OF UTERUS, UNSPECIFIED: ICD-10-CM

## 2023-07-14 RX ORDER — DESOGESTREL AND ETHINYL ESTRADIOL 0.15-0.03
0.15-3 KIT ORAL DAILY
Qty: 3 | Refills: 2 | Status: ACTIVE | COMMUNITY
Start: 2022-11-14 | End: 1900-01-01

## 2023-12-31 PROBLEM — Z11.3 ENCOUNTER FOR SCREENING EXAMINATION FOR SEXUALLY TRANSMITTED DISEASE: Status: ACTIVE | Noted: 2022-02-04

## 2024-01-06 ENCOUNTER — APPOINTMENT (OUTPATIENT)
Dept: ULTRASOUND IMAGING | Facility: CLINIC | Age: 39
End: 2024-01-06
Payer: COMMERCIAL

## 2024-01-06 ENCOUNTER — NON-APPOINTMENT (OUTPATIENT)
Age: 39
End: 2024-01-06

## 2024-01-06 ENCOUNTER — OUTPATIENT (OUTPATIENT)
Dept: OUTPATIENT SERVICES | Facility: HOSPITAL | Age: 39
LOS: 1 days | End: 2024-01-06

## 2024-01-06 DIAGNOSIS — Z90.13 ACQUIRED ABSENCE OF BILATERAL BREASTS AND NIPPLES: Chronic | ICD-10-CM

## 2024-01-06 DIAGNOSIS — Z98.89 OTHER SPECIFIED POSTPROCEDURAL STATES: Chronic | ICD-10-CM

## 2024-01-06 DIAGNOSIS — Z98.890 OTHER SPECIFIED POSTPROCEDURAL STATES: Chronic | ICD-10-CM

## 2024-01-06 PROCEDURE — 76830 TRANSVAGINAL US NON-OB: CPT | Mod: 26

## 2024-01-06 PROCEDURE — 76856 US EXAM PELVIC COMPLETE: CPT | Mod: 26,59

## 2024-01-08 PROBLEM — Z15.01 BRCA1 POSITIVE: Status: ACTIVE | Noted: 2017-08-22

## 2024-01-11 ENCOUNTER — APPOINTMENT (OUTPATIENT)
Dept: GYNECOLOGIC ONCOLOGY | Facility: CLINIC | Age: 39
End: 2024-01-11
Payer: COMMERCIAL

## 2024-01-11 VITALS
HEIGHT: 66 IN | WEIGHT: 160 LBS | HEART RATE: 92 BPM | BODY MASS INDEX: 25.71 KG/M2 | SYSTOLIC BLOOD PRESSURE: 122 MMHG | DIASTOLIC BLOOD PRESSURE: 81 MMHG | RESPIRATION RATE: 61 BRPM | OXYGEN SATURATION: 100 %

## 2024-01-11 DIAGNOSIS — Z15.01 GENETIC SUSCEPTIBILITY TO MALIGNANT NEOPLASM OF BREAST: ICD-10-CM

## 2024-01-11 DIAGNOSIS — Z15.09 GENETIC SUSCEPTIBILITY TO MALIGNANT NEOPLASM OF BREAST: ICD-10-CM

## 2024-01-11 LAB — CANCER AG125 SERPL-ACNC: 8 U/ML

## 2024-01-11 PROCEDURE — 99459 PELVIC EXAMINATION: CPT

## 2024-01-11 PROCEDURE — 99213 OFFICE O/P EST LOW 20 MIN: CPT

## 2024-01-26 NOTE — PLAN
[TextEntry] : Follow up US in 2 months.  and follow up via TEB in 2 months.  Follow up annually for exam.  Semiannual US recommended along with Ca125.

## 2024-01-26 NOTE — ASSESSMENT
[FreeTextEntry1] : Patient doing well. She will be following with GYN in 1 year for follow up regarding low grade pap, I reviewed her most recent US which was overall reassuring, showed a small cyst. I am recommending a follow up US in 2 months to make sure cyst is resolving. Ca125 was normal. Discussed with patient no increased concern at this time. She reports not having her period for 2 months given IVF cycle. Had stimulation in October but stopped because she was not producing.   Overall reassuring exam   Patient plans on having risk reducing surgery at age of 41.

## 2024-01-26 NOTE — HISTORY OF PRESENT ILLNESS
[FreeTextEntry1] : 39 y/o patient with BRCA 1(+) mutation, anticipating future risk reducing BSO. Patient completed oocyte retrieval with Dr. Goss at [facility]. She is now s/p abdominal myomectomy with Dr. Baca in July/2022. Patient presents today to discuss rrBSO. Today she denies persistent abdominal bloating, abdominopelvic pain. She reports normal bowel/bladder functions.Patient is seeking rrBSO closer to her 41th birthday. Status post abdominal myomectomy with Dr. Baca in July.  6/9/23: US IMPRESSION: Multiple uterine fibroids measuring slightly larger than on the prior exam. This could be related to measurement technique rather than true change. Continued attention on follow-up ultrasound is recommended.. Normal endometrium and ovaries.  Pelvic/TV US  1/6/24: LMP: 01/02/2024 IMPRESSION: Uterine fibroids measuring slightly smaller than compared to the most recent prior exam, however stable since 12/29/2022. Variations in measurement precision are felt to be likely. 3.5 cm left ovarian cyst with layering internal debris.   (11/14/23) - 8  She follows with Dr. Deedee Baca/GYN: L PAP 3/24/23 LGSIL negative HPV

## 2024-01-26 NOTE — PHYSICAL EXAM
[Chaperone Present] : A chaperone was present in the examining room during all aspects of the physical examination [Abnormal] : Bimanual Exam: Abnormal [Normal] : Recto-Vaginal Exam: Normal [FreeTextEntry1] : Justyna Pretty Medical assistant chaperoned during gynecologic exam.  [de-identified] : Slightly deviated to the left  [de-identified] : Mild tenderness on left side

## 2024-01-26 NOTE — END OF VISIT
[FreeTextEntry3] : Written by Justyna Pretty, acting as a scribe for Dr. Margaret Jeffries This note accurately reflects the work and decisions made by me.

## 2024-01-26 NOTE — REASON FOR VISIT
[FreeTextEntry1] : [facility] [place]. [phone] [address] [place] [address] Genetic testing [8/2015]: BRCA 1 (+) mutation surveillance

## 2024-03-14 ENCOUNTER — APPOINTMENT (OUTPATIENT)
Dept: GYNECOLOGIC ONCOLOGY | Facility: CLINIC | Age: 39
End: 2024-03-14

## 2024-03-20 ENCOUNTER — OUTPATIENT (OUTPATIENT)
Dept: OUTPATIENT SERVICES | Facility: HOSPITAL | Age: 39
LOS: 1 days | End: 2024-03-20

## 2024-03-20 ENCOUNTER — APPOINTMENT (OUTPATIENT)
Dept: ULTRASOUND IMAGING | Facility: CLINIC | Age: 39
End: 2024-03-20
Payer: COMMERCIAL

## 2024-03-20 DIAGNOSIS — Z98.89 OTHER SPECIFIED POSTPROCEDURAL STATES: Chronic | ICD-10-CM

## 2024-03-20 DIAGNOSIS — Z98.890 OTHER SPECIFIED POSTPROCEDURAL STATES: Chronic | ICD-10-CM

## 2024-03-20 DIAGNOSIS — Z90.13 ACQUIRED ABSENCE OF BILATERAL BREASTS AND NIPPLES: Chronic | ICD-10-CM

## 2024-03-20 PROCEDURE — 76856 US EXAM PELVIC COMPLETE: CPT | Mod: 26,59

## 2024-03-20 PROCEDURE — 76830 TRANSVAGINAL US NON-OB: CPT | Mod: 26

## 2024-03-27 ENCOUNTER — APPOINTMENT (OUTPATIENT)
Dept: GYNECOLOGIC ONCOLOGY | Facility: CLINIC | Age: 39
End: 2024-03-27

## 2024-05-08 ENCOUNTER — APPOINTMENT (OUTPATIENT)
Dept: ULTRASOUND IMAGING | Facility: CLINIC | Age: 39
End: 2024-05-08

## 2024-06-04 ENCOUNTER — APPOINTMENT (OUTPATIENT)
Dept: ULTRASOUND IMAGING | Facility: CLINIC | Age: 39
End: 2024-06-04
Payer: COMMERCIAL

## 2024-06-04 PROCEDURE — 76831 ECHO EXAM UTERUS: CPT

## 2024-06-04 PROCEDURE — 58340 CATHETER FOR HYSTEROGRAPHY: CPT

## 2024-06-07 ENCOUNTER — APPOINTMENT (OUTPATIENT)
Dept: OBGYN | Facility: CLINIC | Age: 39
End: 2024-06-07
Payer: COMMERCIAL

## 2024-06-07 VITALS
WEIGHT: 163 LBS | BODY MASS INDEX: 22.82 KG/M2 | DIASTOLIC BLOOD PRESSURE: 83 MMHG | HEIGHT: 71 IN | OXYGEN SATURATION: 98 % | HEART RATE: 87 BPM | SYSTOLIC BLOOD PRESSURE: 121 MMHG

## 2024-06-07 PROCEDURE — 99395 PREV VISIT EST AGE 18-39: CPT

## 2024-06-07 NOTE — PLAN
[FreeTextEntry1] : 40 yo, annual visit, stable  HCM - pap/GCC done today - STD bloods today - vit D/exercise/calcium - f/u PCP for annual and appropriate immunizations - rto 1 year

## 2024-06-07 NOTE — HISTORY OF PRESENT ILLNESS
[FreeTextEntry1] : 40 yo LMP 5/24/24 presents for annual visit. She is doing well and has no complaints. She had a pelvic US done 3/20/24 which showed her ovarian cyst increased in size and a thickened endometrium. Pt recently had a SHG (ordered by Dr. Jeffries) which showed the cyst decreased in size as well as a thin lining and no endometrial polyp.  Pt has greater than 30 eggs frozen. She does not currently have a partner and wants one before trying to conceive. She is not open to gestational carriers due to the cost.   GYNH: fibroid uterus, BRCA+ s/p mastectomy + reconstruction, hx of abnormal pap PMH: anemia PSH: mastectomy/reconstruction, ovarian cystectomy, myomectomy Allergies: NKDA Medications: Apri  Pelvic US 3/20/24 FINDINGS: Uterus: 8.4 cm x 4.5 cm x 4.9 cm. fibroids again seen measuring 1.8 cm in the right fundus and 1.2 cm in the posterior body. Endometrium: 7 mm. Within normal limits.  Right ovary: 2.6 cm x 2.0 cm x 1.4 cm. Within normal limits. Normal arterial and venous waveforms. Left ovary: 5.2 cm x 1.8 cm x 3.4 cm. 3.5 cm left ovarian cyst with layering internal debris. Normal arterial and venous waveforms.  Fluid: None.  IMPRESSION: Uterine fibroids measuring slightly smaller than compared to the most recent prior exam, however stable since 12/29/2022. Variations in measurement precision are felt to be likely. 3.5 cm left ovarian cyst with layering internal debris.  SHG 6/4/24 FINDINGS: The endometrial walls measure 3-4 mm each. No polyp or some mucosal fibroid is seen. There are left ovarian cysts/follicles measuring 2.3 x 1.3 x 1.7 cm and 2.1 x 1.5 cm, decreased in size compared to the prior exam.  IMPRESSION: No endometrial polyp or submucosal fibroid is seen.   [PapSmeardate] : 3/23

## 2024-06-07 NOTE — PHYSICAL EXAM
[Appropriately responsive] : appropriately responsive [Alert] : alert [No Acute Distress] : no acute distress [No Lymphadenopathy] : no lymphadenopathy [Soft] : soft [Non-tender] : non-tender [Non-distended] : non-distended [No HSM] : No HSM [No Lesions] : no lesions [No Mass] : no mass [Oriented x3] : oriented x3 [Examination Of The Breasts] : a normal appearance [Breast Reconstruction Right] : breast reconstruction [___] : a [unfilled] ~Ucm mastectomy scar [Breast Reconstruction Left] : breast reconstruction [No Masses] : no breast masses were palpable [Labia Majora] : normal [Labia Minora] : normal [Normal] : normal [Uterine Adnexae] : normal

## 2024-06-07 NOTE — END OF VISIT
[FreeTextEntry3] :  I, July Pulido, acted as a scribe on behalf of Dr. Deedee Baca on 06/07/2024.   All medical entries made by the scribe were at my, Dr. Deedee Baca's, direction and personally dictated by me on 06/07/2024. I have reviewed the chart and agree that the record accurately reflects my personal performance of the history, physical exam, assessment, and plan. I have also personally directed, reviewed, and agreed with the chart.
Home

## 2024-06-09 LAB
HBV SURFACE AG SER QL: NONREACTIVE
HCV AB SER QL: NONREACTIVE
HCV S/CO RATIO: 0.37 S/CO
HIV1+2 AB SPEC QL IA.RAPID: NONREACTIVE
T PALLIDUM AB SER QL IA: NEGATIVE

## 2024-07-19 ENCOUNTER — APPOINTMENT (OUTPATIENT)
Dept: OBGYN | Facility: CLINIC | Age: 39
End: 2024-07-19
Payer: COMMERCIAL

## 2024-07-19 VITALS
HEIGHT: 66 IN | SYSTOLIC BLOOD PRESSURE: 121 MMHG | WEIGHT: 156.13 LBS | BODY MASS INDEX: 25.09 KG/M2 | HEART RATE: 93 BPM | DIASTOLIC BLOOD PRESSURE: 83 MMHG

## 2024-07-19 DIAGNOSIS — R87.612 LOW GRADE SQUAMOUS INTRAEPITHELIAL LESION ON CYTOLOGIC SMEAR OF CERVIX (LGSIL): ICD-10-CM

## 2024-07-19 PROCEDURE — 57454 BX/CURETT OF CERVIX W/SCOPE: CPT

## 2024-07-22 NOTE — REVIEW OF SYSTEMS

## 2024-07-24 LAB — CORE LAB BIOPSY: NORMAL

## 2024-07-25 ENCOUNTER — APPOINTMENT (OUTPATIENT)
Dept: GYNECOLOGIC ONCOLOGY | Facility: CLINIC | Age: 39
End: 2024-07-25

## 2024-07-25 VITALS
BODY MASS INDEX: 24.43 KG/M2 | OXYGEN SATURATION: 100 % | SYSTOLIC BLOOD PRESSURE: 118 MMHG | WEIGHT: 152 LBS | HEART RATE: 80 BPM | DIASTOLIC BLOOD PRESSURE: 76 MMHG | HEIGHT: 66 IN | RESPIRATION RATE: 16 BRPM

## 2024-07-25 PROCEDURE — 99214 OFFICE O/P EST MOD 30 MIN: CPT

## 2024-07-25 PROCEDURE — 99459 PELVIC EXAMINATION: CPT

## 2024-08-02 NOTE — HISTORY OF PRESENT ILLNESS
[FreeTextEntry1] : 38 y/o patient with BRCA 1(+) mutation, anticipating future risk reducing BSO. Patient completed oocyte retrieval with Dr. Goss at [facility]. She is now s/p abdominal myomectomy with Dr. Baca in July/2022. Patient presents today to discuss rrBSO. Today she denies persistent abdominal bloating, abdominopelvic pain. She reports normal bowel/bladder functions.Patient is seeking rrBSO closer to her 41th birthday.   6/9/23: US IMPRESSION: Multiple uterine fibroids measuring slightly larger than on the prior exam. This could be related to measurement technique rather than true change. Continued attention on follow-up ultrasound is recommended.. Normal endometrium and ovaries.  Pelvic/TV US 1/6/24: LMP: 01/02/2024 IMPRESSION: Uterine fibroids measuring slightly smaller than compared to the most recent prior exam, however stable since 12/29/2022. Variations in measurement precision are felt to be likely. 3.5 cm left ovarian cyst with layering internal debris.   (11/14/23) - 8  She follows with Dr. Deedee Baca/GYN: L PAP 3/24/23 LGSIL negative HPV  PELV TV US 3/20/24: IMPRESSION: 1. Since 1/6/2024, there has been an increase in the size of a now 4.8 cm simple cyst in the left ovary. Given the patient's history of BRCA1 positive, either close interval follow-up pelvic ultrasound exam in six weeks or further evaluation with MRI of pelvis recommended in addition to clinical correlation with tumor markers. 2. Thickened endometrium. Possible small endometrial polyp. Either attention on follow-up exams or sonohysterogram recommended.  US sonohyst 6/4/24:IMPRESSION: No endometrial polyp or submucosal fibroid is seen.  left ovarian cysts/follicles measuring 2.3 x 1.3 x 1.7 cm and 2.1 x 1.5 cm, decreased in size compared to the prior exam.

## 2024-08-02 NOTE — REASON FOR VISIT
[FreeTextEntry1] : [facility] [place]. [phone] [address] [place] [address] Genetic testing [8/2015]: BRCA 1 (+) mutation surveillance Review Sonohyst and Ca125

## 2024-08-02 NOTE — PHYSICAL EXAM
[Chaperone Present] : A chaperone was present in the examining room during all aspects of the physical examination [[id number]] : A chaperone was present during the pelvic exam. [Normal] : Recto-Vaginal Exam: Normal [FreeTextEntry2] : Justyna Pretty Medical assistant chaperoned during gynecologic exam.

## 2024-08-02 NOTE — PLAN
[TextEntry] : PELV/TV US in 4 months  Ca125 TEB to review results.  Follow up in 1 year, sooner p.r.n.

## 2024-08-02 NOTE — REASON FOR VISIT
[FreeTextEntry1] : [facility] [place]. [phone] [address] [place] [address] Genetic testing [8/2015]: BRCA 1 (+) mutation surveillance Review Sonohyst and Ca125

## 2024-08-02 NOTE — REASON FOR VISIT
[FreeTextEntry1] : [facility] [place]. [phone] [address] [place] [address] Genetic testing [8/2015]: BRCA 1 (+) mutation surveillance Review Sonohyst and Ca125

## 2024-08-02 NOTE — HISTORY OF PRESENT ILLNESS
[FreeTextEntry1] : 38 y/o patient with BRCA 1(+) mutation, anticipating future risk reducing BSO. Patient completed oocyte retrieval with Dr. Goss at [facility]. She is now s/p abdominal myomectomy with Dr. Baca in July/2022. Patient presents today to discuss rrBSO. Today she denies persistent abdominal bloating, abdominopelvic pain. She reports normal bowel/bladder functions.Patient is seeking rrBSO closer to her 41th birthday.   6/9/23: US IMPRESSION: Multiple uterine fibroids measuring slightly larger than on the prior exam. This could be related to measurement technique rather than true change. Continued attention on follow-up ultrasound is recommended.. Normal endometrium and ovaries.  Pelvic/TV US 1/6/24: LMP: 01/02/2024 IMPRESSION: Uterine fibroids measuring slightly smaller than compared to the most recent prior exam, however stable since 12/29/2022. Variations in measurement precision are felt to be likely. 3.5 cm left ovarian cyst with layering internal debris.   (11/14/23) - 8  She follows with Dr. Deedee Baca/GYN: L PAP 3/24/23 LGSIL negative HPV  PELV TV US 3/20/24: IMPRESSION: 1. Since 1/6/2024, there has been an increase in the size of a now 4.8 cm simple cyst in the left ovary. Given the patient's history of BRCA1 positive, either close interval follow-up pelvic ultrasound exam in six weeks or further evaluation with MRI of pelvis recommended in addition to clinical correlation with tumor markers. 2. Thickened endometrium. Possible small endometrial polyp. Either attention on follow-up exams or sonohysterogram recommended.  US sonohyst 6/4/24:IMPRESSION: No endometrial polyp or submucosal fibroid is seen.  left ovarian cysts/follicles measuring 2.3 x 1.3 x 1.7 cm and 2.1 x 1.5 cm, decreased in size compared to the prior exam.

## 2024-08-02 NOTE — PHYSICAL EXAM
[Chaperone Present] : A chaperone was present in the examining room during all aspects of the physical examination [[id number]] : A chaperone was present during the pelvic exam. [Normal] : Recto-Vaginal Exam: Normal [FreeTextEntry2] : Justyna Pretty Medical assistant chaperoned during gynecologic exam.

## 2024-08-02 NOTE — ASSESSMENT
[FreeTextEntry1] : Patient had a colposcopy with Dr. Baca on the 19th, appears to be all benign.   I reviewed patients sonohysterogram which is overal reassuring. She has a small cyst on her left ovary which is persistent and smaller than last imaging, her lining of the uterus appears wnl.   Patient would like to do surgery closer to age 41, advised patient that I would not push surgery beyond 41. She reports her period is irregular now, more spacing out and skipping, advised patient could be hormonal at this point as it would be too early for perimenopause.    Patient is doing well from a GYN standpoint, will continue to monitor patient with a follow up US in 4 months, if cyst continues to persist will have to make a judgement call on whether or not we should proceed with surgery sooner. Will review results with her via TEB and patient will follow up yearly as long as everything looks okay.

## 2024-08-02 NOTE — PHYSICAL EXAM
[Chaperone Present] : A chaperone was present in the examining room during all aspects of the physical examination [[id number]] : A chaperone was present during the pelvic exam. [Normal] : Recto-Vaginal Exam: Normal [FreeTextEntry2] : Justyna Pretty Medical assistant chaperoned during gynecologic exam.

## 2024-08-02 NOTE — HISTORY OF PRESENT ILLNESS
[FreeTextEntry1] : 38 y/o patient with BRCA 1(+) mutation, anticipating future risk reducing BSO. Patient completed oocyte retrieval with Dr. Goss at [facility]. She is now s/p abdominal myomectomy with Dr. Baca in July/2022. Patient presents today to discuss rrBSO. Today she denies persistent abdominal bloating, abdominopelvic pain. She reports normal bowel/bladder functions.Patient is seeking rrBSO closer to her 41th birthday.   6/9/23: US IMPRESSION: Multiple uterine fibroids measuring slightly larger than on the prior exam. This could be related to measurement technique rather than true change. Continued attention on follow-up ultrasound is recommended.. Normal endometrium and ovaries.  Pelvic/TV US 1/6/24: LMP: 01/02/2024 IMPRESSION: Uterine fibroids measuring slightly smaller than compared to the most recent prior exam, however stable since 12/29/2022. Variations in measurement precision are felt to be likely. 3.5 cm left ovarian cyst with layering internal debris.   (11/14/23) - 8  She follows with Dr. Deedee Baca/GYN: L PAP 3/24/23 LGSIL negative HPV  PELV TV US 3/20/24: IMPRESSION: 1. Since 1/6/2024, there has been an increase in the size of a now 4.8 cm simple cyst in the left ovary. Given the patient's history of BRCA1 positive, either close interval follow-up pelvic ultrasound exam in six weeks or further evaluation with MRI of pelvis recommended in addition to clinical correlation with tumor markers. 2. Thickened endometrium. Possible small endometrial polyp. Either attention on follow-up exams or sonohysterogram recommended.  US sonohyst 6/4/24:IMPRESSION: No endometrial polyp or submucosal fibroid is seen.  left ovarian cysts/follicles measuring 2.3 x 1.3 x 1.7 cm and 2.1 x 1.5 cm, decreased in size compared to the prior exam.

## 2024-10-23 ENCOUNTER — APPOINTMENT (OUTPATIENT)
Dept: ULTRASOUND IMAGING | Facility: CLINIC | Age: 39
End: 2024-10-23

## 2024-10-23 ENCOUNTER — OUTPATIENT (OUTPATIENT)
Dept: OUTPATIENT SERVICES | Facility: HOSPITAL | Age: 39
LOS: 1 days | End: 2024-10-23

## 2024-10-23 DIAGNOSIS — Z98.89 OTHER SPECIFIED POSTPROCEDURAL STATES: Chronic | ICD-10-CM

## 2024-10-23 DIAGNOSIS — Z98.890 OTHER SPECIFIED POSTPROCEDURAL STATES: Chronic | ICD-10-CM

## 2024-10-23 DIAGNOSIS — Z90.13 ACQUIRED ABSENCE OF BILATERAL BREASTS AND NIPPLES: Chronic | ICD-10-CM

## 2024-10-23 PROCEDURE — 76830 TRANSVAGINAL US NON-OB: CPT | Mod: 26

## 2024-10-23 PROCEDURE — 76856 US EXAM PELVIC COMPLETE: CPT | Mod: 26,59

## 2024-10-24 DIAGNOSIS — N83.209 UNSPECIFIED OVARIAN CYST, UNSPECIFIED SIDE: ICD-10-CM

## 2024-11-01 ENCOUNTER — APPOINTMENT (OUTPATIENT)
Dept: MRI IMAGING | Facility: CLINIC | Age: 39
End: 2024-11-01

## 2024-11-01 ENCOUNTER — OUTPATIENT (OUTPATIENT)
Dept: OUTPATIENT SERVICES | Facility: HOSPITAL | Age: 39
LOS: 1 days | End: 2024-11-01

## 2024-11-01 DIAGNOSIS — Z90.13 ACQUIRED ABSENCE OF BILATERAL BREASTS AND NIPPLES: Chronic | ICD-10-CM

## 2024-11-01 DIAGNOSIS — Z98.890 OTHER SPECIFIED POSTPROCEDURAL STATES: Chronic | ICD-10-CM

## 2024-11-01 DIAGNOSIS — Z98.89 OTHER SPECIFIED POSTPROCEDURAL STATES: Chronic | ICD-10-CM

## 2024-11-01 PROCEDURE — 72197 MRI PELVIS W/O & W/DYE: CPT | Mod: 26

## 2024-11-07 ENCOUNTER — APPOINTMENT (OUTPATIENT)
Dept: GYNECOLOGIC ONCOLOGY | Facility: CLINIC | Age: 39
End: 2024-11-07
Payer: COMMERCIAL

## 2024-11-07 VITALS
DIASTOLIC BLOOD PRESSURE: 85 MMHG | WEIGHT: 160 LBS | HEIGHT: 66 IN | HEART RATE: 89 BPM | SYSTOLIC BLOOD PRESSURE: 119 MMHG | BODY MASS INDEX: 25.71 KG/M2 | RESPIRATION RATE: 16 BRPM | OXYGEN SATURATION: 100 %

## 2024-11-07 PROCEDURE — 99459 PELVIC EXAMINATION: CPT

## 2024-11-07 PROCEDURE — 99214 OFFICE O/P EST MOD 30 MIN: CPT

## 2024-11-11 ENCOUNTER — APPOINTMENT (OUTPATIENT)
Dept: GYNECOLOGIC ONCOLOGY | Facility: CLINIC | Age: 39
End: 2024-11-11

## 2024-11-11 ENCOUNTER — NON-APPOINTMENT (OUTPATIENT)
Age: 39
End: 2024-11-11

## 2024-11-11 LAB — CANCER AG125 SERPL-ACNC: 6 U/ML

## 2024-11-11 PROCEDURE — 99213 OFFICE O/P EST LOW 20 MIN: CPT

## 2024-12-16 NOTE — PRE-OP CHECKLIST - IV STARTED
color variation. A referral to Dr. Guillen'yes

## 2025-01-31 ENCOUNTER — APPOINTMENT (OUTPATIENT)
Dept: ULTRASOUND IMAGING | Facility: CLINIC | Age: 40
End: 2025-01-31
Payer: COMMERCIAL

## 2025-01-31 ENCOUNTER — OUTPATIENT (OUTPATIENT)
Dept: OUTPATIENT SERVICES | Facility: HOSPITAL | Age: 40
LOS: 1 days | End: 2025-01-31

## 2025-01-31 DIAGNOSIS — Z98.890 OTHER SPECIFIED POSTPROCEDURAL STATES: Chronic | ICD-10-CM

## 2025-01-31 DIAGNOSIS — Z90.13 ACQUIRED ABSENCE OF BILATERAL BREASTS AND NIPPLES: Chronic | ICD-10-CM

## 2025-01-31 DIAGNOSIS — Z98.89 OTHER SPECIFIED POSTPROCEDURAL STATES: Chronic | ICD-10-CM

## 2025-01-31 PROCEDURE — 76830 TRANSVAGINAL US NON-OB: CPT | Mod: 26

## 2025-02-05 ENCOUNTER — APPOINTMENT (OUTPATIENT)
Dept: GYNECOLOGIC ONCOLOGY | Facility: CLINIC | Age: 40
End: 2025-02-05

## 2025-02-05 ENCOUNTER — NON-APPOINTMENT (OUTPATIENT)
Age: 40
End: 2025-02-05

## 2025-02-05 VITALS
RESPIRATION RATE: 16 BRPM | WEIGHT: 160 LBS | HEART RATE: 82 BPM | HEIGHT: 66 IN | BODY MASS INDEX: 25.71 KG/M2 | OXYGEN SATURATION: 99 % | SYSTOLIC BLOOD PRESSURE: 100 MMHG | DIASTOLIC BLOOD PRESSURE: 68 MMHG

## 2025-02-05 DIAGNOSIS — Z15.09 GENETIC SUSCEPTIBILITY TO MALIGNANT NEOPLASM OF BREAST: ICD-10-CM

## 2025-02-05 DIAGNOSIS — Z15.01 GENETIC SUSCEPTIBILITY TO MALIGNANT NEOPLASM OF BREAST: ICD-10-CM

## 2025-02-05 LAB — CANCER AG125 SERPL-ACNC: 10 U/ML

## 2025-02-05 PROCEDURE — G2211 COMPLEX E/M VISIT ADD ON: CPT | Mod: NC

## 2025-02-05 PROCEDURE — 99213 OFFICE O/P EST LOW 20 MIN: CPT

## 2025-02-05 PROCEDURE — 99459 PELVIC EXAMINATION: CPT | Mod: NC

## 2025-07-05 ENCOUNTER — NON-APPOINTMENT (OUTPATIENT)
Age: 40
End: 2025-07-05

## 2025-07-17 ENCOUNTER — LABORATORY RESULT (OUTPATIENT)
Age: 40
End: 2025-07-17

## 2025-07-17 ENCOUNTER — APPOINTMENT (OUTPATIENT)
Dept: OBGYN | Facility: CLINIC | Age: 40
End: 2025-07-17
Payer: COMMERCIAL

## 2025-07-17 VITALS
BODY MASS INDEX: 26.36 KG/M2 | SYSTOLIC BLOOD PRESSURE: 119 MMHG | HEIGHT: 66 IN | WEIGHT: 164 LBS | DIASTOLIC BLOOD PRESSURE: 77 MMHG

## 2025-07-17 PROBLEM — N92.6 IRREGULAR MENSES: Status: ACTIVE | Noted: 2025-07-17

## 2025-07-17 PROBLEM — Z12.4 ENCOUNTER FOR PAPANICOLAOU SMEAR OF CERVIX: Status: ACTIVE | Noted: 2025-07-17

## 2025-07-17 PROCEDURE — 99395 PREV VISIT EST AGE 18-39: CPT

## 2025-07-18 ENCOUNTER — NON-APPOINTMENT (OUTPATIENT)
Age: 40
End: 2025-07-18

## 2025-07-18 LAB
HBV SURFACE AG SER QL: NONREACTIVE
HCG SERPL-MCNC: <1 MIU/ML
HCV AB SER QL: NONREACTIVE
HCV S/CO RATIO: 0.35 S/CO
HIV1+2 AB SPEC QL IA.RAPID: NONREACTIVE
T PALLIDUM AB SER QL IA: NEGATIVE

## 2025-07-19 LAB
C TRACH RRNA SPEC QL NAA+PROBE: NOT DETECTED
HPV HIGH+LOW RISK DNA PNL CVX: DETECTED
N GONORRHOEA RRNA SPEC QL NAA+PROBE: NOT DETECTED
SOURCE TP AMPLIFICATION: NORMAL

## 2025-07-23 LAB — CYTOLOGY CVX/VAG DOC THIN PREP: ABNORMAL

## 2025-07-30 ENCOUNTER — APPOINTMENT (OUTPATIENT)
Dept: ULTRASOUND IMAGING | Facility: CLINIC | Age: 40
End: 2025-07-30
Payer: COMMERCIAL

## 2025-07-30 PROCEDURE — 76830 TRANSVAGINAL US NON-OB: CPT

## 2025-08-01 ENCOUNTER — APPOINTMENT (OUTPATIENT)
Dept: OBGYN | Facility: CLINIC | Age: 40
End: 2025-08-01
Payer: COMMERCIAL

## 2025-08-01 VITALS
DIASTOLIC BLOOD PRESSURE: 82 MMHG | HEIGHT: 66 IN | WEIGHT: 160 LBS | BODY MASS INDEX: 25.71 KG/M2 | SYSTOLIC BLOOD PRESSURE: 117 MMHG

## 2025-08-01 DIAGNOSIS — R87.810 ATYPICAL SQUAMOUS CELLS OF UNDETERMINED SIGNIFICANCE ON CYTOLOGIC SMEAR OF CERVIX (ASC-US): ICD-10-CM

## 2025-08-01 DIAGNOSIS — R87.610 ATYPICAL SQUAMOUS CELLS OF UNDETERMINED SIGNIFICANCE ON CYTOLOGIC SMEAR OF CERVIX (ASC-US): ICD-10-CM

## 2025-08-01 PROCEDURE — 57454 BX/CURETT OF CERVIX W/SCOPE: CPT

## 2025-08-05 ENCOUNTER — NON-APPOINTMENT (OUTPATIENT)
Age: 40
End: 2025-08-05

## 2025-08-06 ENCOUNTER — APPOINTMENT (OUTPATIENT)
Dept: GYNECOLOGIC ONCOLOGY | Facility: CLINIC | Age: 40
End: 2025-08-06
Payer: COMMERCIAL

## 2025-08-06 VITALS
RESPIRATION RATE: 16 BRPM | OXYGEN SATURATION: 99 % | SYSTOLIC BLOOD PRESSURE: 110 MMHG | WEIGHT: 160 LBS | HEIGHT: 66 IN | HEART RATE: 72 BPM | BODY MASS INDEX: 25.71 KG/M2 | DIASTOLIC BLOOD PRESSURE: 70 MMHG

## 2025-08-06 LAB — CANCER AG125 SERPL-ACNC: 7 U/ML

## 2025-08-06 PROCEDURE — 99214 OFFICE O/P EST MOD 30 MIN: CPT

## 2025-08-06 PROCEDURE — 99459 PELVIC EXAMINATION: CPT | Mod: NC
